# Patient Record
Sex: FEMALE | Race: BLACK OR AFRICAN AMERICAN | NOT HISPANIC OR LATINO | Employment: OTHER | URBAN - METROPOLITAN AREA
[De-identification: names, ages, dates, MRNs, and addresses within clinical notes are randomized per-mention and may not be internally consistent; named-entity substitution may affect disease eponyms.]

---

## 2017-02-14 ENCOUNTER — TELEPHONE (OUTPATIENT)
Dept: TRANSPLANT | Facility: CLINIC | Age: 37
End: 2017-02-14

## 2017-02-14 NOTE — TELEPHONE ENCOUNTER
"Notified that pt has missed her 3rd Psych appt. I called and spoke with pt who states she cancelled her last appt as "I didn't have a ride, well actually I have a car but I didn't have the money to buy gas." I explained to her that she will not be transplanted if she does not see psych and praised her for doing all the other workup. She states one of the appts was scheduled on a dialysis day so she could not keep that appt for that reason. I did confirm her dialysis days TTS. She also states she has started school for dialysis technician which makes it hard to change dialysis times. I reiterated to her if she does not keep her next psych appt her chart will be closed. She stated "Oh I will not miss it."  "

## 2017-03-06 ENCOUNTER — CLINICAL SUPPORT (OUTPATIENT)
Dept: SMOKING CESSATION | Facility: CLINIC | Age: 37
End: 2017-03-06
Payer: COMMERCIAL

## 2017-03-06 DIAGNOSIS — F17.200 NICOTINE DEPENDENCE: Primary | ICD-10-CM

## 2017-03-06 PROCEDURE — 99407 BEHAV CHNG SMOKING > 10 MIN: CPT | Mod: S$GLB,,,

## 2017-05-02 ENCOUNTER — TELEPHONE (OUTPATIENT)
Dept: PSYCHIATRY | Facility: CLINIC | Age: 37
End: 2017-05-02

## 2017-05-04 ENCOUNTER — TELEPHONE (OUTPATIENT)
Dept: TRANSPLANT | Facility: CLINIC | Age: 37
End: 2017-05-04

## 2017-05-04 NOTE — TELEPHONE ENCOUNTER
I received a call from Robert social work at [facility] for this patient to verify patients psych appointment so he can encourage her compliance. He states pt is aware that she must keep this appt so her chart is not closed. She does say however her last appt was cancelled in error due to no fault of her own and before that she was scheduled on a dialysis day and could not make it.

## 2017-07-03 ENCOUNTER — TELEPHONE (OUTPATIENT)
Dept: PSYCHIATRY | Facility: CLINIC | Age: 37
End: 2017-07-03

## 2017-08-15 ENCOUNTER — TELEPHONE (OUTPATIENT)
Dept: TRANSPLANT | Facility: CLINIC | Age: 37
End: 2017-08-15

## 2017-08-15 NOTE — TELEPHONE ENCOUNTER
----- Message from Cynthia Blevins sent at 8/15/2017  9:08 AM CDT -----  Contact: Ce Colunga/.Dialysis center   Wants a call from coord @ # [phone] Ext # [phone]    Calling to find out when pt can re-apply for an eval

## 2017-08-15 NOTE — TELEPHONE ENCOUNTER
"Call returned to Ce Colunga unit manager for dialysis unit. She is inquiring what pt would need to do for re-refferal. I explained to her pt would need psychiatry clearance to come in the door as pt cancelled/no showed 3 psychiatry appts. I had lengthy conversation with Robert,  at the unit, before the last missed appt and he stated he would speak with the pt about importance of keeping the appt. Ce states pt is very compliant in the unit, "one of my best patients." She states Ted did not start out that way but has made great strides. Ce will work her to get psychiatry clearance.  "

## 2017-09-26 ENCOUNTER — TELEPHONE (OUTPATIENT)
Dept: TRANSPLANT | Facility: CLINIC | Age: 37
End: 2017-09-26

## 2017-09-26 NOTE — TELEPHONE ENCOUNTER
Returned call to Kathryn who is the  at the dialysis unit. She wanted to know what Ted needed to do for re-referral. I advised that she needs clearance from Addictive psychiatry related to history of cocaine use and current PICA. I advised that if psychiatry has questions we can be called. I also advised that workup process would start over again as workup has .

## 2017-09-26 NOTE — TELEPHONE ENCOUNTER
----- Message from Shahnaz Lynn sent at 9/26/2017  9:54 AM CDT -----  Contact: Kathryn Lehman from the Dialysis Center in [place] calling to find out information about getting a kidney referral for this patient.      Please call [phone] ext [phone]    Thanks!!

## 2017-09-28 ENCOUNTER — TELEPHONE (OUTPATIENT)
Dept: PSYCHIATRY | Facility: CLINIC | Age: 37
End: 2017-09-28

## 2018-06-05 ENCOUNTER — TELEPHONE (OUTPATIENT)
Dept: TRANSPLANT | Facility: CLINIC | Age: 38
End: 2018-06-05

## 2018-06-05 NOTE — TELEPHONE ENCOUNTER
Nurse spoke with Robert and informed him that the patient was denied on 8/8/17 due to multiple no shows/ cancellations for regular and addictive psychiatry. Robert verbalized understanding of the above information.

## 2018-06-05 NOTE — TELEPHONE ENCOUNTER
----- Message from Veronica Victor sent at 6/5/2018  8:12 AM CDT -----  Contact: Robert ballard/ [facility]    Reason for call:    Check Status on Kidney Xplant Status   Communication Preference: [phone]  Additional Information:

## 2018-07-31 ENCOUNTER — TELEPHONE (OUTPATIENT)
Dept: TRANSPLANT | Facility: CLINIC | Age: 38
End: 2018-07-31

## 2018-07-31 NOTE — TELEPHONE ENCOUNTER
Nurse spoke with Kathryn rodriguez clermarcie at patients dialysis unit. She was informed that patient will need regular and addictive psychiatry clearance before being re referred. Kathryn verbalized understanding of the above information.

## 2018-07-31 NOTE — TELEPHONE ENCOUNTER
----- Message from Veronica Victor sent at 7/31/2018  9:33 AM CDT -----  Contact: [facility]  Needs Advice    Reason for call:   Requesting a call from coordinator to verify what all needs to be done to re-admit pt for transplant.   Communication Preference: Kathryn [phone], ext [phone]  Additional Information: n/a

## 2018-10-04 PROBLEM — N87.9 CERVICAL DYSPLASIA: Status: ACTIVE | Noted: 2018-10-04

## 2018-10-04 PROBLEM — N87.9 CERVICAL DYSPLASIA: Status: RESOLVED | Noted: 2018-10-04 | Resolved: 2018-10-04

## 2018-10-04 PROBLEM — Z90.710 S/P TAH (TOTAL ABDOMINAL HYSTERECTOMY): Status: ACTIVE | Noted: 2018-10-04

## 2018-10-26 ENCOUNTER — SOCIAL WORK (OUTPATIENT)
Dept: TRANSPLANT | Facility: CLINIC | Age: 38
End: 2018-10-26

## 2018-10-26 NOTE — PROGRESS NOTES
SW was contacted by referral coordinator: MIKE Song RN who reports receiving a psych note from pt's dialysis unit : Robert Estevez LMSW since pt was previously deferred for needing to be cleared by psych. Pt no showed/cacncelled multiple appointments with [facility] psych and they are no longer willing to see her. Pt then was seen by a local psychologist: Valente García, PhD([phone], ph; [phone], fax). SW reviewed the note and in this SW opinion it did not indicate that pt was cleared by this psychologist. The note does indicated that pt has agreed to psychotherapy and this SW would recommend that pt continue receive care for her pica, anxiety, depression and other diagnoses indicated in this note. SW will work with pt's dialysis unit to request updated information regarding pt's mental health treatment.    Psychosocial Suitability: Patient presents as an unacceptable candidate for [facility] at this time. Pt will need to continue therapy as indicated, follow through with recommendations, and receive psych clearance prior to being referred for transplant evaluation.      Copy of note is below:

## 2018-12-12 ENCOUNTER — TELEPHONE (OUTPATIENT)
Dept: TRANSPLANT | Facility: CLINIC | Age: 38
End: 2018-12-12

## 2018-12-12 NOTE — TELEPHONE ENCOUNTER
MINI contacted MINI Jones at pt's dialysis unit. MINI explained that after reading the note from pt's psychiatrist visit that pt was not cleared from a psychiatric standpoint at this time. MINI explained that the expectation from the transplant team is that pt follow through with recommendations from the psychiatrist or other mental health professionals. Robert verbalized understanding and agreement. MINI explained that once pt has psych clearance, then she can be re-referred. MINI remains available to pt, pt's family, and transplant team at [phone].            ----- Message from Breanna Song RN sent at 12/12/2018 10:12 AM CST -----  Contact:  [facility] in [place],     Please see the message below. Can you please call and speak with Robert regarding the patients need for psych. Clearance? Let me know when you are done so I can send the denial letter.     ThanksBreanna   ----- Message -----  From: Veronica Victor  Sent: 12/12/2018   9:35 AM  To: [facility]-Kidney Transplant [facility]    Needs Advice    Reason for call: Requesting transplant update sheet documents faxed to [phone]        Communication Preference: Phone: Robert [phone]    Additional Information: n/a

## 2018-12-13 ENCOUNTER — TELEPHONE (OUTPATIENT)
Dept: TRANSPLANT | Facility: CLINIC | Age: 38
End: 2018-12-13

## 2018-12-13 NOTE — TELEPHONE ENCOUNTER
----- Message from Taniya Lynn LCSW sent at 12/12/2018 10:39 AM CST -----  Contact:  [facility] in [place], [place]   Spoke with Robert and explained our concerns about pt's mental health and that we are expecting her to follow though with recommendations.  ----- Message -----  From: Breanna Song RN  Sent: 12/12/2018  10:12 AM  To: Taniya Lynn LCSW    Dimitri,     Please see the message below. Can you please call and speak with Robert regarding the patients need for psych. Clearance? Let me know when you are done so I can send the denial letter.     ThanksBreanna   ----- Message -----  From: Veronica Victor  Sent: 12/12/2018   9:35 AM  To: [facility] Pre-Kidney Transplant Clinical    Needs Advice    Reason for call: Requesting transplant update sheet documents faxed to [phone]        Communication Preference: Phone: Robert [phone]    Additional Information: n/a

## 2024-04-05 ENCOUNTER — TELEPHONE (OUTPATIENT)
Dept: GYNECOLOGIC ONCOLOGY | Facility: CLINIC | Age: 44
End: 2024-04-05
Payer: COMMERCIAL

## 2024-04-05 NOTE — NURSING
New referral received from Dr Stover for recent diagnosis of ovarian masses. Pat from Dr Stover's office contacted navigator with update that Dr Stover will send his op-note once completed, but the referral was due to Dr Stover halting a surgery due to multiple masses noted once surgery began. Dr Branch feels it best to have GYN/ONC see her to address her masses.     Pt called and name and  verified. Explained my role as nurse navigator and direct number provided. Options for GYN/ONC providers, including [facility], all clinic locations and soonest availability discussed with pt. Pt scheduled to see Merna Loomis PA-C in the next available appointment. Patient encouraged to call for any questions or concerns about her care or appointments. Pt verbalized understanding. Date time and location of appointment reviewed with pt. Appointment letter mailed and emailed to pt.     Oncology Navigation   Intake  Cancer Type: Gynecologic (ovarian masses)  Type of Referral: External (Dr Tyrees Stover)  Date of Referral: 24  Initial Nurse Navigator Contact: 24  Referral to Initial Contact Timeline (days): 9  Date Worked: 24     Treatment  Current Status: Staging work-up    Procedures: Ultrasound; Biopsy; Other  Biopsy Schedule Date: 24 (cul-de-sac fluid- negative for malignancy, mildly cellular smears showing few benign mesothelial cells in a background of blood)  Ultrasound Schedule Date: 24 ([place]= one complex cyst measures roughly 5.1x5x4.7cm. the other complex cyst measures roughtly 3.9x3.2x3.1cm)  Other Schedule Date: 24 (labs: HE4= 1621; = 13; CA25= 15.5)     Acuity      Follow Up:    Obtain Operative note from Dr Stover's office  LVM for Pat to send once completed

## 2024-04-08 NOTE — PROGRESS NOTES
REFERRING PROVIDER  Tyrese Stover MD   Nephrologist: Dr. Jason Peraza    HISTORY OF PRESENT CONDITION  CC: R ovarian cysts  Ted Edwards is a 43 y.o.  New referral received from Dr Stover for recent diagnosis of ovarian masses. Initially presented with pelvic pain. Imaging showed b/l ovarian cysts. She underwent diagnostic laparoscopy which was aborted due to multiple masses noted once surgery began (op note pending). Cul-de-sac fluid Cytology negative,  CA-125 WNL.  Patient presents today with her daughter for consult. Confirms history above. Endorses continued pelvic pain/pressure and constipation.     Of note, history of MARYLOU (2018, persistent cervical dysplasia), Cholecystectomy, tubal ligation,  x3 (lower transverse), LEEP (CIN3). End stage renal disease (was on  HD since , recently switched to HHD)     Data Reviewed:   24 (US done at [facility]): one complex cyst measures roughly 5.1x5x4.7cm. the other complex cyst measures roughtly 3.9x3.2x3.1cm. cysts with internal echos and septationsprior hysterectomy     24 diagnostic laparoscopy : cul-de-sac fluid- negative for malignancy, mildly cellular smears showing few benign mesothelial cells in a background of blood     24 labs: HE4= 1621; = 13; CA25= 15.5    Past Medical History:   Diagnosis Date    Abnormal Pap smear of cervix     Anemia     Chronic kidney disease     Dialysis patient     Disorder of kidney and ureter     Encounter for blood transfusion     1 time    Hyperlipidemia     Hypertension     Obesity     Pica in adults     eating baby powder    Substance abuse     cocaine use -occassional use, stopped in     Thyroid disease       Current Outpatient Medications   Medication Sig    acetaminophen (TYLENOL) 325 MG tablet Take 2 tablets (650 mg total) by mouth every 8 (eight) hours as needed for Pain.    amlodipine (NORVASC) 5 MG tablet Take 5 mg by mouth once daily.    aspirin  (ECOTRIN) 81 MG EC tablet Take 81 mg by mouth once daily.    cholecalciferol, vitamin D3, (VITAMIN D3) 400 unit Chew Take 3 tablets by mouth once daily.    cloNIDine (CATAPRES) 0.2 MG tablet Take 0.2 mg by mouth 3 (three) times daily.    cyclobenzaprine (FLEXERIL) 10 MG tablet Take 10 mg by mouth 3 (three) times daily as needed for Muscle spasms.    HYDROcodone-acetaminophen (NORCO)  mg per tablet Take 1 tablet by mouth every 6 (six) hours as needed for Pain.    ipratropium-albuterol (COMBIVENT)  mcg/actuation inhaler Inhale 1 puff into the lungs every 6 (six) hours as needed for Wheezing.     omega-3 fatty acids-vitamin E (FISH OIL) 1,000 mg Cap Take 1 capsule by mouth once daily.    pantoprazole (PROTONIX) 40 MG tablet     rosuvastatin (CRESTOR) 10 MG tablet     sevelamer carbonate (RENVELA) 800 mg Tab Take 4,000 mg by mouth 3 (three) times daily with meals. 2400 mg with snacks    terbinafine HCl (LAMISIL) 250 mg tablet     zolpidem (AMBIEN) 10 mg Tab      Current Facility-Administered Medications   Medication    ondansetron disintegrating tablet 8 mg    promethazine (PHENERGAN) 12.5 mg in dextrose 5 % 50 mL IVPB     Review of patient's allergies indicates:  No Known Allergies    Past Surgical History:   Procedure Laterality Date    AV FISTULA PLACEMENT Right     CERVICAL BIOPSY  W/ LOOP ELECTRODE EXCISION       SECTION      x3    CHOLECYSTECTOMY      COLPOSCOPY      LAPAROSCOPIC TOTAL HYSTERECTOMY N/A 10/4/2018    Procedure: HYSTERECTOMY, TOTAL, LAPAROSCOPIC;  Surgeon: Carlo Espino MD;  Location: [facility];  Service: OB/GYN;  Laterality: N/A;  Converted to MARYLOU    LUNG SURGERY      left side fluid removal    thyroidectomy Left     partial    TONSILLECTOMY      TOTAL ABDOMINAL HYSTERECTOMY N/A 10/4/2018    Procedure: HYSTERECTOMY, TOTAL, ABDOMINAL;  Surgeon: Carlo Espino MD;  Location: [facility];  Service: OB/GYN;  Laterality: N/A;    TUBAL LIGATION          FAMILY HISTORY  Family History    Problem Relation Age of Onset    Clotting disorder Mother     Heart disease Father     Heart attack Father        SOCIAL HISTORY    reports that she has been smoking cigarettes. She started smoking about 16 years ago. She has a 0.9 pack-year smoking history. She has never used smokeless tobacco. She reports that she does not drink alcohol and does not use drugs.    REVIEW OF SYSTEMS  Review of Systems   Constitutional:  Negative for activity change, fatigue, fever and unexpected weight change.   Respiratory:  Negative for cough and shortness of breath.    Cardiovascular:  Negative for chest pain.   Gastrointestinal:  Positive for constipation. Negative for abdominal distention, abdominal pain and diarrhea.   Genitourinary:  Positive for pelvic pain. Negative for difficulty urinating, dysuria, frequency, urgency, vaginal bleeding and vaginal pain.   Neurological:  Negative for dizziness and weakness.       OBJECTIVE   Vitals:    04/11/24 1325   BP: 128/80   Pulse: 67      Body mass index is 33.3 kg/m².     Physical Exam:   Constitutional: She is oriented to person, place, and time. She appears well-developed.        Pulmonary/Chest: Effort normal and breath sounds normal. No respiratory distress.        Abdominal: Soft. She exhibits no distension. There is no abdominal tenderness.   Lower Transverse incision          Genitourinary:    Genitourinary Comments: Vulva - normal  Vagina - no lesions  Cervix - surgically absent  Uterus - surgically absent  Adnexa - no masses appreciated on bimanual                  Neurological: She is alert and oriented to person, place, and time.    Skin: No rash noted.    Psychiatric: She has a normal mood and affect. Judgment normal.     ECOG status: 1    LABORATORY DATA  Lab data reviewed.    RADIOLOGICAL DATA  Radiology data reviewed.    PATHOLOGY DATA  Pathology data reviewed.    ASSESSMENT    1. Ovarian mass    2. Cyst of ovary, unspecified laterality          PLAN  Orders Placed  This Encounter   Procedures    US Transvaginal Non OB   Follow-up TVUS - scheduled on 4/17/24  Obtain op note from diagnostic lap (Dr. Stover)   Once imaging obtained, will discuss case with Dr. Rutherford and update patient on definitive plan.     Merna Loomis PA-C

## 2024-04-11 ENCOUNTER — OFFICE VISIT (OUTPATIENT)
Dept: GYNECOLOGIC ONCOLOGY | Facility: CLINIC | Age: 44
End: 2024-04-11
Payer: COMMERCIAL

## 2024-04-11 VITALS
HEIGHT: 64 IN | HEART RATE: 67 BPM | DIASTOLIC BLOOD PRESSURE: 80 MMHG | BODY MASS INDEX: 33.12 KG/M2 | WEIGHT: 194 LBS | SYSTOLIC BLOOD PRESSURE: 128 MMHG

## 2024-04-11 DIAGNOSIS — N83.8 OVARIAN MASS: Primary | ICD-10-CM

## 2024-04-11 DIAGNOSIS — N83.209 CYST OF OVARY, UNSPECIFIED LATERALITY: ICD-10-CM

## 2024-04-11 PROCEDURE — 3079F DIAST BP 80-89 MM HG: CPT | Mod: CPTII,S$GLB,,

## 2024-04-11 PROCEDURE — 3074F SYST BP LT 130 MM HG: CPT | Mod: CPTII,S$GLB,,

## 2024-04-11 PROCEDURE — 99204 OFFICE O/P NEW MOD 45 MIN: CPT | Mod: S$GLB,,,

## 2024-04-11 PROCEDURE — 3008F BODY MASS INDEX DOCD: CPT | Mod: CPTII,S$GLB,,

## 2024-04-11 PROCEDURE — 1159F MED LIST DOCD IN RCRD: CPT | Mod: CPTII,S$GLB,,

## 2024-04-17 ENCOUNTER — HOSPITAL ENCOUNTER (OUTPATIENT)
Dept: RADIOLOGY | Facility: HOSPITAL | Age: 44
Discharge: HOME | End: 2024-04-17
Payer: COMMERCIAL

## 2024-04-17 DIAGNOSIS — N83.8 OVARIAN MASS: ICD-10-CM

## 2024-04-17 PROCEDURE — 76830 TRANSVAGINAL US NON-OB: CPT | Mod: TC

## 2024-04-26 ENCOUNTER — TELEPHONE (OUTPATIENT)
Dept: GYNECOLOGIC ONCOLOGY | Facility: CLINIC | Age: 44
End: 2024-04-26
Payer: COMMERCIAL

## 2024-04-26 NOTE — TELEPHONE ENCOUNTER
Called patient to discuss her care. Dr. Rutherford will reach out to Dr. Stover to get more information regarding some of the intra-op findings he reported. He will also review the intra-op photos in person when he is here in [place] on May 7th. Will update with final recommendations at that time.

## 2024-05-08 ENCOUNTER — TELEPHONE (OUTPATIENT)
Dept: GYNECOLOGIC ONCOLOGY | Facility: CLINIC | Age: 44
End: 2024-05-08
Payer: COMMERCIAL

## 2024-05-08 NOTE — TELEPHONE ENCOUNTER
Called Dr. Stover office and left a message with his staff to have him call Dr. Rutherford to discuss op report. Dr. Stover is out today but will be back in office tomorrow. I provided Dr. Rutherford and my cell phone number.

## 2025-07-24 ENCOUNTER — HOSPITAL ENCOUNTER (INPATIENT)
Facility: HOSPITAL | Age: 45
LOS: 4 days | Discharge: HOME | DRG: 640 | End: 2025-07-28
Attending: EMERGENCY MEDICINE | Admitting: STUDENT IN AN ORGANIZED HEALTH CARE EDUCATION/TRAINING PROGRAM
Payer: MEDICARE

## 2025-07-24 DIAGNOSIS — R06.02 SHORTNESS OF BREATH: ICD-10-CM

## 2025-07-24 DIAGNOSIS — Z99.2 ANEMIA IN CHRONIC KIDNEY DISEASE, ON CHRONIC DIALYSIS: Primary | ICD-10-CM

## 2025-07-24 DIAGNOSIS — D63.1 ANEMIA IN CHRONIC KIDNEY DISEASE, ON CHRONIC DIALYSIS: Primary | ICD-10-CM

## 2025-07-24 DIAGNOSIS — N18.6 ANEMIA IN CHRONIC KIDNEY DISEASE, ON CHRONIC DIALYSIS: Primary | ICD-10-CM

## 2025-07-24 DIAGNOSIS — E87.70 FLUID OVERLOAD: ICD-10-CM

## 2025-07-24 DIAGNOSIS — R07.9 CHEST PAIN: ICD-10-CM

## 2025-07-24 DIAGNOSIS — I21.4 NSTEMI (NON-ST ELEVATED MYOCARDIAL INFARCTION): ICD-10-CM

## 2025-07-24 LAB
ALBUMIN SERPL-MCNC: 4.1 G/DL (ref 3.5–5)
ALBUMIN/GLOB SERPL: 1.1 RATIO (ref 1.1–2)
ALP SERPL-CCNC: 77 UNIT/L (ref 40–150)
ALT SERPL-CCNC: 17 UNIT/L (ref 0–55)
ANION GAP SERPL CALC-SCNC: 20 MEQ/L
AST SERPL-CCNC: 15 UNIT/L (ref 11–45)
BASOPHILS # BLD AUTO: 0.04 X10(3)/MCL
BASOPHILS NFR BLD AUTO: 0.5 %
BILIRUB SERPL-MCNC: 0.8 MG/DL
BUN SERPL-MCNC: 90.1 MG/DL (ref 7–18.7)
CALCIUM SERPL-MCNC: 9.9 MG/DL (ref 8.4–10.2)
CHLORIDE SERPL-SCNC: 102 MMOL/L (ref 98–107)
CO2 SERPL-SCNC: 20 MMOL/L (ref 22–29)
CREAT SERPL-MCNC: 28.5 MG/DL (ref 0.55–1.02)
CREAT/UREA NIT SERPL: 3
EOSINOPHIL # BLD AUTO: 0.09 X10(3)/MCL (ref 0–0.9)
EOSINOPHIL NFR BLD AUTO: 1.2 %
ERYTHROCYTE [DISTWIDTH] IN BLOOD BY AUTOMATED COUNT: 15.4 % (ref 11.5–17)
GFR SERPLBLD CREATININE-BSD FMLA CKD-EPI: 1 ML/MIN/1.73/M2
GLOBULIN SER-MCNC: 3.8 GM/DL (ref 2.4–3.5)
GLUCOSE SERPL-MCNC: 103 MG/DL (ref 74–100)
HCT VFR BLD AUTO: 23.7 % (ref 37–47)
HGB BLD-MCNC: 7.7 G/DL (ref 12–16)
IMM GRANULOCYTES # BLD AUTO: 0.05 X10(3)/MCL (ref 0–0.04)
IMM GRANULOCYTES NFR BLD AUTO: 0.7 %
LYMPHOCYTES # BLD AUTO: 1.05 X10(3)/MCL (ref 0.6–4.6)
LYMPHOCYTES NFR BLD AUTO: 13.9 %
MCH RBC QN AUTO: 31.3 PG (ref 27–31)
MCHC RBC AUTO-ENTMCNC: 32.5 G/DL (ref 33–36)
MCV RBC AUTO: 96.3 FL (ref 80–94)
MONOCYTES # BLD AUTO: 0.55 X10(3)/MCL (ref 0.1–1.3)
MONOCYTES NFR BLD AUTO: 7.3 %
NEUTROPHILS # BLD AUTO: 5.76 X10(3)/MCL (ref 2.1–9.2)
NEUTROPHILS NFR BLD AUTO: 76.4 %
NRBC BLD AUTO-RTO: 0 %
OHS QRS DURATION: 96 MS
OHS QTC CALCULATION: 468 MS
PLATELET # BLD AUTO: 174 X10(3)/MCL (ref 130–400)
PMV BLD AUTO: 9.8 FL (ref 7.4–10.4)
POTASSIUM SERPL-SCNC: 4.1 MMOL/L (ref 3.5–5.1)
PROT SERPL-MCNC: 7.9 GM/DL (ref 6.4–8.3)
RBC # BLD AUTO: 2.46 X10(6)/MCL (ref 4.2–5.4)
SODIUM SERPL-SCNC: 142 MMOL/L (ref 136–145)
TROPONIN I SERPL HS-MCNC: 40 NG/L
WBC # BLD AUTO: 7.54 X10(3)/MCL (ref 4.5–11.5)

## 2025-07-24 PROCEDURE — 85025 COMPLETE CBC W/AUTO DIFF WBC: CPT

## 2025-07-24 PROCEDURE — 93010 ELECTROCARDIOGRAM REPORT: CPT | Mod: ,,, | Performed by: INTERNAL MEDICINE

## 2025-07-24 PROCEDURE — 11000001 HC ACUTE MED/SURG PRIVATE ROOM

## 2025-07-24 PROCEDURE — 93005 ELECTROCARDIOGRAM TRACING: CPT

## 2025-07-24 PROCEDURE — 84484 ASSAY OF TROPONIN QUANT: CPT

## 2025-07-24 PROCEDURE — 21400001 HC TELEMETRY ROOM

## 2025-07-24 PROCEDURE — 80053 COMPREHEN METABOLIC PANEL: CPT

## 2025-07-24 PROCEDURE — 99285 EMERGENCY DEPT VISIT HI MDM: CPT | Mod: 25

## 2025-07-24 RX ORDER — HYDRALAZINE HYDROCHLORIDE 25 MG/1
25 TABLET, FILM COATED ORAL 2 TIMES DAILY
Status: DISCONTINUED | OUTPATIENT
Start: 2025-07-25 | End: 2025-07-26

## 2025-07-24 RX ORDER — BUSPIRONE HYDROCHLORIDE 5 MG/1
10 TABLET ORAL 3 TIMES DAILY
Status: DISCONTINUED | OUTPATIENT
Start: 2025-07-25 | End: 2025-07-28 | Stop reason: HOSPADM

## 2025-07-24 RX ORDER — ALBUTEROL SULFATE 90 UG/1
1 INHALANT RESPIRATORY (INHALATION) EVERY 6 HOURS PRN
Status: DISCONTINUED | OUTPATIENT
Start: 2025-07-24 | End: 2025-07-28 | Stop reason: HOSPADM

## 2025-07-24 RX ORDER — PANTOPRAZOLE SODIUM 40 MG/1
40 TABLET, DELAYED RELEASE ORAL DAILY
Status: DISCONTINUED | OUTPATIENT
Start: 2025-07-25 | End: 2025-07-28 | Stop reason: HOSPADM

## 2025-07-24 RX ORDER — ATORVASTATIN CALCIUM 40 MG/1
40 TABLET, FILM COATED ORAL DAILY
Status: DISCONTINUED | OUTPATIENT
Start: 2025-07-25 | End: 2025-07-28 | Stop reason: HOSPADM

## 2025-07-24 RX ORDER — AMLODIPINE BESYLATE 5 MG/1
10 TABLET ORAL DAILY
Status: DISCONTINUED | OUTPATIENT
Start: 2025-07-25 | End: 2025-07-28 | Stop reason: HOSPADM

## 2025-07-24 RX ORDER — FERRIC CITRATE 210 MG/1
3 TABLET, FILM COATED ORAL
COMMUNITY

## 2025-07-24 RX ORDER — CLONAZEPAM 1 MG/1
1 TABLET ORAL NIGHTLY PRN
COMMUNITY

## 2025-07-24 RX ORDER — OMEPRAZOLE 40 MG/1
40 CAPSULE, DELAYED RELEASE ORAL EVERY MORNING
COMMUNITY

## 2025-07-24 RX ORDER — ALBUTEROL SULFATE 90 UG/1
1 INHALANT RESPIRATORY (INHALATION) EVERY 6 HOURS PRN
COMMUNITY
Start: 2025-06-09

## 2025-07-24 RX ORDER — PLECANATIDE 3 MG/1
3 TABLET ORAL DAILY
COMMUNITY

## 2025-07-24 RX ORDER — LEVOTHYROXINE SODIUM 50 UG/1
50 TABLET ORAL DAILY
Status: DISCONTINUED | OUTPATIENT
Start: 2025-07-25 | End: 2025-07-28 | Stop reason: HOSPADM

## 2025-07-24 RX ORDER — AMLODIPINE BESYLATE 10 MG/1
10 TABLET ORAL DAILY
COMMUNITY
Start: 2025-06-27

## 2025-07-24 RX ORDER — SEVELAMER CARBONATE 800 MG/1
800 TABLET, FILM COATED ORAL
Status: DISCONTINUED | OUTPATIENT
Start: 2025-07-25 | End: 2025-07-28 | Stop reason: HOSPADM

## 2025-07-24 RX ORDER — LEVOTHYROXINE SODIUM 50 UG/1
50 TABLET ORAL DAILY
COMMUNITY

## 2025-07-24 RX ORDER — DOCUSATE SODIUM 100 MG/1
100 CAPSULE, LIQUID FILLED ORAL 2 TIMES DAILY PRN
Status: DISCONTINUED | OUTPATIENT
Start: 2025-07-24 | End: 2025-07-28 | Stop reason: HOSPADM

## 2025-07-24 RX ORDER — DOCUSATE SODIUM 100 MG/1
100 CAPSULE, LIQUID FILLED ORAL 2 TIMES DAILY PRN
COMMUNITY

## 2025-07-24 RX ORDER — ONDANSETRON 4 MG/1
8 TABLET, ORALLY DISINTEGRATING ORAL EVERY 8 HOURS PRN
Status: DISCONTINUED | OUTPATIENT
Start: 2025-07-24 | End: 2025-07-28 | Stop reason: HOSPADM

## 2025-07-24 RX ORDER — HYDRALAZINE HYDROCHLORIDE 25 MG/1
25 TABLET, FILM COATED ORAL 2 TIMES DAILY
Status: ON HOLD | COMMUNITY
End: 2025-07-28

## 2025-07-24 RX ORDER — CLONIDINE HYDROCHLORIDE 0.3 MG/1
0.3 TABLET ORAL 3 TIMES DAILY
COMMUNITY

## 2025-07-24 RX ORDER — BUSPIRONE HYDROCHLORIDE 10 MG/1
10 TABLET ORAL 2 TIMES DAILY
COMMUNITY

## 2025-07-24 RX ORDER — ROSUVASTATIN CALCIUM 10 MG/1
10 TABLET, COATED ORAL DAILY
COMMUNITY

## 2025-07-24 RX ORDER — FLUTICASONE PROPIONATE 50 MCG
2 SPRAY, SUSPENSION (ML) NASAL DAILY PRN
COMMUNITY
Start: 2025-06-06

## 2025-07-24 NOTE — FIRST PROVIDER EVALUATION
"Medical screening examination initiated.  I have conducted a focused provider triage encounter, findings are as follows:    Brief history of present illness:  44 year old female presents to ER with c/o Sob and swelling x 2 weeks. Reports intermittent palpitations. HX of CKD on dialysis.     Vitals:    07/24/25 1242   BP: (!) 170/89   Pulse: 87   Resp: 20   Temp: 98.2 °F (36.8 °C)   SpO2: 95%   Weight: 90.7 kg (200 lb)   Height: 5' 4" (1.626 m)       Pertinent physical exam:  awake and alert, nad    Brief workup plan:  labs, EKG,cxr    Preliminary workup initiated; this workup will be continued and followed by the physician or advanced practice provider that is assigned to the patient when roomed.  "

## 2025-07-24 NOTE — ED PROVIDER NOTES
Encounter Date: 2025    SCRIBE #1 NOTE: I, Arnel Shah, am scribing for, and in the presence of,  Chuck Garcia MD. I have scribed the entire note.       History     Chief Complaint   Patient presents with    Shortness of Breath     Steady gait to triage c/o worsening SOB and swelling for over 2 weeks. Reports intermittent episodes of palpitations. HX of CKD on dialysis      44 year old female with a hx of ESRD and HTN presents to the ED for SOB. Pt typically dialyzes 4x a week. Pt states she does her hemodialysis at home, but has not done it lately because she is feeling sick. Pt states she is currently dealing with bronchitis. Pt has a productive cough. Pt also reports episodes of SOB and diffuse fluid overload. Pt is a current smoker.         Review of patient's allergies indicates:  No Known Allergies  Past Medical History:   Diagnosis Date    Abnormal Pap smear of cervix     Anemia     Chronic kidney disease     Dialysis patient     Disorder of kidney and ureter     Encounter for blood transfusion     1 time    Hyperlipidemia     Hypertension     Obesity     Pica in adults     eating baby powder    Substance abuse     cocaine use -occassional use, stopped in     Thyroid disease      Past Surgical History:   Procedure Laterality Date    AV FISTULA PLACEMENT Right     CERVICAL BIOPSY  W/ LOOP ELECTRODE EXCISION       SECTION      x3    CHOLECYSTECTOMY      COLPOSCOPY      LAPAROSCOPIC TOTAL HYSTERECTOMY N/A 10/4/2018    Procedure: HYSTERECTOMY, TOTAL, LAPAROSCOPIC;  Surgeon: Carlo Espino MD;  Location: [facility] OR;  Service: OB/GYN;  Laterality: N/A;  Converted to MARYLOU    LUNG SURGERY      left side fluid removal    thyroidectomy Left     partial    TONSILLECTOMY      TOTAL ABDOMINAL HYSTERECTOMY N/A 10/4/2018    Procedure: HYSTERECTOMY, TOTAL, ABDOMINAL;  Surgeon: Carlo Espino MD;  Location: [facility] OR;  Service: OB/GYN;  Laterality: N/A;    TUBAL LIGATION       Family History   Problem Relation  Name Age of Onset    Clotting disorder Mother      Heart disease Father      Heart attack Father       Social History[1]  Review of Systems   Constitutional:         Fluid overloaded    Respiratory:  Positive for cough and shortness of breath.        Physical Exam     Initial Vitals [07/24/25 1242]   BP Pulse Resp Temp SpO2   (!) 170/89 87 20 98.2 °F (36.8 °C) 95 %      MAP       --         Physical Exam    HENT:   Facial plethora    Eyes: EOM are normal. Right eye exhibits no discharge. Left eye exhibits no discharge. No scleral icterus.   Neck: Neck supple.   Cardiovascular:  Normal rate, regular rhythm and normal heart sounds.           Pulmonary/Chest: No stridor. She has no wheezes.   Crackles at the right base.    Abdominal: She exhibits no distension. There is no abdominal tenderness. There is no rebound and no guarding.   Musculoskeletal:         General: No tenderness or edema. Normal range of motion.      Cervical back: Neck supple.     Neurological: She is alert and oriented to person, place, and time. She has normal strength.   Skin: Skin is dry. No rash noted. No erythema. No pallor.   Psychiatric: She has a normal mood and affect. Her behavior is normal. Judgment and thought content normal.         ED Course   Procedures  Labs Reviewed   COMPREHENSIVE METABOLIC PANEL - Abnormal       Result Value    Sodium 142      Potassium 4.1      Chloride 102      CO2 20 (*)     Glucose 103 (*)     Blood Urea Nitrogen 90.1 (*)     Creatinine 28.50 (*)     Calcium 9.9      Protein Total 7.9      Albumin 4.1      Globulin 3.8 (*)     Albumin/Globulin Ratio 1.1      Bilirubin Total 0.8      ALP 77      ALT 17      AST 15      eGFR 1      Anion Gap 20.0      BUN/Creatinine Ratio 3     TROPONIN I HIGH SENSITIVITY - Abnormal    Troponin High Sensitive 40 (*)    CBC WITH DIFFERENTIAL - Abnormal    WBC 7.54      RBC 2.46 (*)     Hgb 7.7 (*)     Hct 23.7 (*)     MCV 96.3 (*)     MCH 31.3 (*)     MCHC 32.5 (*)     RDW 15.4       Platelet 174      MPV 9.8      Neut % 76.4      Lymph % 13.9      Mono % 7.3      Eos % 1.2      Basophil % 0.5      Imm Grans % 0.7      Neut # 5.76      Lymph # 1.05      Mono # 0.55      Eos # 0.09      Baso # 0.04      Imm Gran # 0.05 (*)     NRBC% 0.0     CBC W/ AUTO DIFFERENTIAL    Narrative:     The following orders were created for panel order CBC auto differential.  Procedure                               Abnormality         Status                     ---------                               -----------         ------                     CBC with Differential[[id number]]       Abnormal            Final result                 Please view results for these tests on the individual orders.     EKG Readings: (Independently Interpreted)   Initial Reading: No STEMI. Rhythm: Normal Sinus Rhythm. Heart Rate: 85. Ectopy: No Ectopy. Conduction: Normal. ST Segments: Normal ST Segments. T Waves: Normal. Axis: Right Axis Deviation.   Done on 7/24/25 at 1242      ECG Results              EKG 12-lead (Final result)        Collection Time Result Time QRS Duration OHS QTC Calculation    07/24/25 12:42:05 07/24/25 14:50:59 96 468                     Final result by Interface, Lab In [facility] (07/24/25 14:51:07)                   Narrative:    Test Reason : R06.02,    Vent. Rate :  85 BPM     Atrial Rate :  85 BPM     P-R Int : 182 ms          QRS Dur :  96 ms      QT Int : 394 ms       P-R-T Axes :  41  94  43 degrees    QTcB Int : 468 ms    Normal sinus rhythm  Rightward axis  Possible Anterior infarct ,age undetermined  Abnormal ECG  When compared with ECG of 06-Sep-2018 12:45,  No significant change was found  Confirmed by Dean Collins ([id number]) on 7/24/2025 2:50:56 PM    Referred By:            Confirmed By: Dean Collins                                  Imaging Results              X-Ray Chest PA And Lateral (Final result)  Result time 07/24/25 13:11:38      Final result by Deng Michael MD (07/24/25 13:11:38)                    Impression:      Cardiomegaly and mild pulmonary edema.      Electronically signed by: Deng Michael  Date:    2025  Time:    13:11               Narrative:    EXAMINATION:  XR CHEST PA AND LATERAL    CLINICAL HISTORY:  Shortness of breath    TECHNIQUE:  Two-view    COMPARISON:  2016..    FINDINGS:  Cardiopericardial silhouette is enlarged there is mild pulmonary edema with subtle ground-glass opacities.  Right basilar pleuroparenchymal scarring versus small pleural effusion and lower lung zone atelectasis.  There is no pneumothorax.                                       Medications - No data to display  Medical Decision Making  The differential diagnosis includes, but is not limited to, electrolyte abnormality, fluid overload, or pneumonia.     Risk  Decision regarding hospitalization.            Scribe Attestation:   Scribe #1: I performed the above scribed service and the documentation accurately describes the services I performed. I attest to the accuracy of the note.    Attending Attestation:           Physician Attestation for Scribe:  Physician Attestation Statement for Scribe #1: I, Chuck Garcia MD, reviewed documentation, as scribed by Arnel Shah in my presence, and it is both accurate and complete.                                        Clinical Impression:  Final diagnoses:  [R06.02] Shortness of breath  [E87.70] Fluid overload          ED Disposition Condition    Admit                       [1]   Social History  Tobacco Use    Smoking status: Every Day     Current packs/day: 0.00     Average packs/day: 0.1 packs/day for 9.0 years (0.9 ttl pk-yrs)     Types: Cigarettes     Start date: 2007     Last attempt to quit: 2016     Years since quittin.8    Smokeless tobacco: Never   Substance Use Topics    Alcohol use: No     Alcohol/week: 0.0 standard drinks of alcohol    Drug use: No     Comment: history        Chuck Garcia MD  25 1800

## 2025-07-25 LAB
ALBUMIN SERPL-MCNC: 3.7 G/DL (ref 3.5–5)
ALBUMIN/GLOB SERPL: 1.2 RATIO (ref 1.1–2)
ALP SERPL-CCNC: 68 UNIT/L (ref 40–150)
ALT SERPL-CCNC: 12 UNIT/L (ref 0–55)
ANION GAP SERPL CALC-SCNC: 20 MEQ/L
AST SERPL-CCNC: 13 UNIT/L (ref 11–45)
BASOPHILS # BLD AUTO: 0.03 X10(3)/MCL
BASOPHILS NFR BLD AUTO: 0.4 %
BILIRUB SERPL-MCNC: 0.7 MG/DL
BUN SERPL-MCNC: 94.8 MG/DL (ref 7–18.7)
CALCIUM SERPL-MCNC: 9.5 MG/DL (ref 8.4–10.2)
CHLORIDE SERPL-SCNC: 103 MMOL/L (ref 98–107)
CO2 SERPL-SCNC: 20 MMOL/L (ref 22–29)
CREAT SERPL-MCNC: 29.8 MG/DL (ref 0.55–1.02)
CREAT/UREA NIT SERPL: 3
EOSINOPHIL # BLD AUTO: 0.12 X10(3)/MCL (ref 0–0.9)
EOSINOPHIL NFR BLD AUTO: 1.7 %
ERYTHROCYTE [DISTWIDTH] IN BLOOD BY AUTOMATED COUNT: 14.9 % (ref 11.5–17)
GFR SERPLBLD CREATININE-BSD FMLA CKD-EPI: 1 ML/MIN/1.73/M2
GLOBULIN SER-MCNC: 3.2 GM/DL (ref 2.4–3.5)
GLUCOSE SERPL-MCNC: 104 MG/DL (ref 74–100)
HCT VFR BLD AUTO: 23.1 % (ref 37–47)
HGB BLD-MCNC: 7.6 G/DL (ref 12–16)
IMM GRANULOCYTES # BLD AUTO: 0.04 X10(3)/MCL (ref 0–0.04)
IMM GRANULOCYTES NFR BLD AUTO: 0.6 %
LYMPHOCYTES # BLD AUTO: 1.2 X10(3)/MCL (ref 0.6–4.6)
LYMPHOCYTES NFR BLD AUTO: 17 %
MAGNESIUM SERPL-MCNC: 3 MG/DL (ref 1.6–2.6)
MCH RBC QN AUTO: 31.1 PG (ref 27–31)
MCHC RBC AUTO-ENTMCNC: 32.9 G/DL (ref 33–36)
MCV RBC AUTO: 94.7 FL (ref 80–94)
MONOCYTES # BLD AUTO: 0.61 X10(3)/MCL (ref 0.1–1.3)
MONOCYTES NFR BLD AUTO: 8.7 %
NEUTROPHILS # BLD AUTO: 5.05 X10(3)/MCL (ref 2.1–9.2)
NEUTROPHILS NFR BLD AUTO: 71.6 %
NRBC BLD AUTO-RTO: 0.3 %
PHOSPHATE SERPL-MCNC: 8.9 MG/DL (ref 2.3–4.7)
PLATELET # BLD AUTO: 176 X10(3)/MCL (ref 130–400)
PMV BLD AUTO: 10.2 FL (ref 7.4–10.4)
POTASSIUM SERPL-SCNC: 4.2 MMOL/L (ref 3.5–5.1)
PROT SERPL-MCNC: 6.9 GM/DL (ref 6.4–8.3)
RBC # BLD AUTO: 2.44 X10(6)/MCL (ref 4.2–5.4)
SODIUM SERPL-SCNC: 143 MMOL/L (ref 136–145)
TROPONIN I SERPL HS-MCNC: 37 NG/L
WBC # BLD AUTO: 7.05 X10(3)/MCL (ref 4.5–11.5)

## 2025-07-25 PROCEDURE — 94640 AIRWAY INHALATION TREATMENT: CPT

## 2025-07-25 PROCEDURE — 94760 N-INVAS EAR/PLS OXIMETRY 1: CPT

## 2025-07-25 PROCEDURE — 85025 COMPLETE CBC W/AUTO DIFF WBC: CPT | Performed by: INTERNAL MEDICINE

## 2025-07-25 PROCEDURE — 63600175 PHARM REV CODE 636 W HCPCS

## 2025-07-25 PROCEDURE — 36415 COLL VENOUS BLD VENIPUNCTURE: CPT | Performed by: INTERNAL MEDICINE

## 2025-07-25 PROCEDURE — 80100016 HC MAINTENANCE HEMODIALYSIS

## 2025-07-25 PROCEDURE — 25000242 PHARM REV CODE 250 ALT 637 W/ HCPCS: Performed by: INTERNAL MEDICINE

## 2025-07-25 PROCEDURE — 63600175 PHARM REV CODE 636 W HCPCS: Mod: TB | Performed by: NURSE PRACTITIONER

## 2025-07-25 PROCEDURE — 99900031 HC PATIENT EDUCATION (STAT)

## 2025-07-25 PROCEDURE — 84100 ASSAY OF PHOSPHORUS: CPT | Performed by: INTERNAL MEDICINE

## 2025-07-25 PROCEDURE — 25000003 PHARM REV CODE 250

## 2025-07-25 PROCEDURE — 80053 COMPREHEN METABOLIC PANEL: CPT | Performed by: INTERNAL MEDICINE

## 2025-07-25 PROCEDURE — 5A1D70Z PERFORMANCE OF URINARY FILTRATION, INTERMITTENT, LESS THAN 6 HOURS PER DAY: ICD-10-PCS | Performed by: INTERNAL MEDICINE

## 2025-07-25 PROCEDURE — 84484 ASSAY OF TROPONIN QUANT: CPT | Performed by: INTERNAL MEDICINE

## 2025-07-25 PROCEDURE — 27000221 HC OXYGEN, UP TO 24 HOURS

## 2025-07-25 PROCEDURE — 21400001 HC TELEMETRY ROOM

## 2025-07-25 PROCEDURE — 83735 ASSAY OF MAGNESIUM: CPT | Performed by: INTERNAL MEDICINE

## 2025-07-25 PROCEDURE — 25000003 PHARM REV CODE 250: Performed by: INTERNAL MEDICINE

## 2025-07-25 PROCEDURE — 99900035 HC TECH TIME PER 15 MIN (STAT)

## 2025-07-25 RX ORDER — MUPIROCIN 20 MG/G
OINTMENT TOPICAL 2 TIMES DAILY
Status: DISCONTINUED | OUTPATIENT
Start: 2025-07-25 | End: 2025-07-28 | Stop reason: HOSPADM

## 2025-07-25 RX ORDER — IPRATROPIUM BROMIDE AND ALBUTEROL SULFATE 2.5; .5 MG/3ML; MG/3ML
3 SOLUTION RESPIRATORY (INHALATION) EVERY 6 HOURS
Status: DISCONTINUED | OUTPATIENT
Start: 2025-07-25 | End: 2025-07-26

## 2025-07-25 RX ORDER — HYDRALAZINE HYDROCHLORIDE 20 MG/ML
10 INJECTION INTRAMUSCULAR; INTRAVENOUS ONCE
Status: COMPLETED | OUTPATIENT
Start: 2025-07-25 | End: 2025-07-25

## 2025-07-25 RX ORDER — ACETAMINOPHEN 500 MG
1000 TABLET ORAL EVERY 6 HOURS PRN
Status: DISCONTINUED | OUTPATIENT
Start: 2025-07-25 | End: 2025-07-28 | Stop reason: HOSPADM

## 2025-07-25 RX ORDER — GUAIFENESIN AND DEXTROMETHORPHAN HYDROBROMIDE 10; 100 MG/5ML; MG/5ML
5 SYRUP ORAL EVERY 4 HOURS PRN
Status: DISCONTINUED | OUTPATIENT
Start: 2025-07-25 | End: 2025-07-28 | Stop reason: HOSPADM

## 2025-07-25 RX ORDER — HYDRALAZINE HYDROCHLORIDE 20 MG/ML
10 INJECTION INTRAMUSCULAR; INTRAVENOUS EVERY 6 HOURS PRN
Status: DISCONTINUED | OUTPATIENT
Start: 2025-07-25 | End: 2025-07-28 | Stop reason: HOSPADM

## 2025-07-25 RX ORDER — GUAIFENESIN AND DEXTROMETHORPHAN HYDROBROMIDE 10; 100 MG/5ML; MG/5ML
5 SYRUP ORAL ONCE
Status: COMPLETED | OUTPATIENT
Start: 2025-07-25 | End: 2025-07-25

## 2025-07-25 RX ORDER — LABETALOL HYDROCHLORIDE 5 MG/ML
10 INJECTION, SOLUTION INTRAVENOUS EVERY 6 HOURS PRN
Status: DISCONTINUED | OUTPATIENT
Start: 2025-07-25 | End: 2025-07-28 | Stop reason: HOSPADM

## 2025-07-25 RX ADMIN — CLONIDINE HYDROCHLORIDE 0.3 MG: 0.2 TABLET ORAL at 09:07

## 2025-07-25 RX ADMIN — SEVELAMER CARBONATE 800 MG: 800 TABLET, FILM COATED ORAL at 05:07

## 2025-07-25 RX ADMIN — SEVELAMER CARBONATE 800 MG: 800 TABLET, FILM COATED ORAL at 12:07

## 2025-07-25 RX ADMIN — CLONIDINE HYDROCHLORIDE 0.3 MG: 0.2 TABLET ORAL at 05:07

## 2025-07-25 RX ADMIN — HYDRALAZINE HYDROCHLORIDE 10 MG: 20 INJECTION INTRAMUSCULAR; INTRAVENOUS at 12:07

## 2025-07-25 RX ADMIN — HYDRALAZINE HYDROCHLORIDE 25 MG: 25 TABLET ORAL at 08:07

## 2025-07-25 RX ADMIN — LINACLOTIDE 145 MCG: 145 CAPSULE, GELATIN COATED ORAL at 09:07

## 2025-07-25 RX ADMIN — LEVOTHYROXINE SODIUM 50 MCG: 0.05 TABLET ORAL at 09:07

## 2025-07-25 RX ADMIN — HYDRALAZINE HYDROCHLORIDE 25 MG: 25 TABLET ORAL at 09:07

## 2025-07-25 RX ADMIN — PANTOPRAZOLE SODIUM 40 MG: 40 TABLET, DELAYED RELEASE ORAL at 09:07

## 2025-07-25 RX ADMIN — GUAIFENESIN AND DEXTROMETHORPHAN 5 ML: 100; 10 SYRUP ORAL at 05:07

## 2025-07-25 RX ADMIN — ATORVASTATIN CALCIUM 40 MG: 40 TABLET, FILM COATED ORAL at 09:07

## 2025-07-25 RX ADMIN — HYDRALAZINE HYDROCHLORIDE 10 MG: 20 INJECTION INTRAMUSCULAR; INTRAVENOUS at 05:07

## 2025-07-25 RX ADMIN — EPOETIN ALFA-EPBX 20000 UNITS: 20000 INJECTION, SOLUTION INTRAVENOUS; SUBCUTANEOUS at 12:07

## 2025-07-25 RX ADMIN — AMLODIPINE BESYLATE 10 MG: 5 TABLET ORAL at 09:07

## 2025-07-25 RX ADMIN — SEVELAMER CARBONATE 800 MG: 800 TABLET, FILM COATED ORAL at 09:07

## 2025-07-25 RX ADMIN — BUSPIRONE HYDROCHLORIDE 10 MG: 5 TABLET ORAL at 05:07

## 2025-07-25 RX ADMIN — MUPIROCIN: 20 OINTMENT TOPICAL at 08:07

## 2025-07-25 RX ADMIN — ACETAMINOPHEN 1000 MG: 500 TABLET ORAL at 04:07

## 2025-07-25 RX ADMIN — IPRATROPIUM BROMIDE AND ALBUTEROL SULFATE 3 ML: .5; 3 SOLUTION RESPIRATORY (INHALATION) at 08:07

## 2025-07-25 RX ADMIN — CLONIDINE HYDROCHLORIDE 0.3 MG: 0.2 TABLET ORAL at 11:07

## 2025-07-25 RX ADMIN — BUSPIRONE HYDROCHLORIDE 10 MG: 5 TABLET ORAL at 11:07

## 2025-07-25 RX ADMIN — BUSPIRONE HYDROCHLORIDE 10 MG: 5 TABLET ORAL at 09:07

## 2025-07-25 NOTE — PLAN OF CARE
Problem: Adult Inpatient Plan of Care  Goal: Plan of Care Review  Outcome: Progressing  Goal: Patient-Specific Goal (Individualized)  Outcome: Progressing  Goal: Optimal Comfort and Wellbeing  Outcome: Progressing  Goal: Readiness for Transition of Care  Outcome: Progressing     Problem: Hemodialysis  Goal: Safe, Effective Therapy Delivery  Outcome: Progressing  Goal: Effective Tissue Perfusion  Outcome: Progressing  Goal: Absence of Infection Signs and Symptoms  Outcome: Progressing

## 2025-07-25 NOTE — PROGRESS NOTES
Inpatient Nutrition Assessment    Admit Date: 7/24/2025   Total duration of encounter: 1 day   Patient Age: 44 y.o.    Nutrition Recommendation/Prescription     Continue current diet (Diet Renal On Dialysis Standard Tray) as tolerated.   Add Novasource Renal (provides 475 kcal, 22 g protein per serving) BID    Continue phosphate binder with meals as medically feasible.     Communication of Recommendations: reviewed with patient    Nutrition Assessment     Malnutrition Assessment/Nutrition-Focused Physical Exam       Malnutrition Level: other (see comments) (Does not meet criteria) (07/25/25 1050)                                                        A minimum of two characteristics is recommended for diagnosis of either severe or non-severe malnutrition.    Chart Review    Reason Seen: malnutrition screening tool (MST)    Malnutrition Screening Tool Results   Have you recently lost weight without trying?: Unsure  Have you been eating poorly because of a decreased appetite?: No   MST Score: 2   Diagnosis:  ESRD on HD  Anemia  Medical noncompliance     Relevant Medical History:    ESRD on HD, hypertension, anemia secondary to chronic kidney disease, hyperlipidemia, hypothyroidism, and substance abuse (discontinued use in 2008)     Scheduled Medications:  amLODIPine, 10 mg, Daily  atorvastatin, 40 mg, Daily  busPIRone, 10 mg, TID  cloNIDine, 0.3 mg, TID  epoetin zoraida-ebpx (RETACRIT) injection, 20,000 Units, Every Mon, Wed, Fri  hydrALAZINE, 25 mg, BID  levothyroxine, 50 mcg, Daily  linaCLOtide, 145 mcg, Daily  pantoprazole, 40 mg, Daily  sevelamer carbonate, 800 mg, TID WM    Continuous Infusions:   PRN Medications:  acetaminophen, 1,000 mg, Q6H PRN  albuterol, 1 puff, Q6H PRN  docusate sodium, 100 mg, BID PRN  hydrALAZINE, 10 mg, Q6H PRN  ondansetron, 8 mg, Q8H PRN    Calorie Containing IV Medications: no significant kcals from medications at this time    Recent Labs   Lab 07/24/25  1411 07/24/25  1429 07/25/25  0234  "  NA  --  142 143   K  --  4.1 4.2   CALCIUM  --  9.9 9.5   PHOS  --   --  8.9*   MG  --   --  3.00*   CL  --  102 103   CO2  --  20* 20*   BUN  --  90.1* 94.8*   CREATININE  --  28.50* 29.80*   EGFRNORACEVR  --  1 1   GLU  --  103* 104*   BILITOT  --  0.8 0.7   ALKPHOS  --  77 68   ALT  --  17 12   AST  --  15 13   ALBUMIN  --  4.1 3.7   WBC 7.54  --  7.05   HGB 7.7*  --  7.6*   HCT 23.7*  --  23.1*     Nutrition Orders:  Diet Renal On Dialysis Standard Tray      Appetite/Oral Intake: fair/25-50% of meals  Factors Affecting Nutritional Intake: decreased appetite  Social Needs Impacting Access to Food: none identified  Food/Mosque/Cultural Preferences: none reported  Food Allergies: no known food allergies  Last Bowel Movement: 07/23/25  Wound(s):  no partial or full thickness pressure injuries documented    Comments    7/25/25: Pt reports fair appetite today, no issues PTA. Pt states her dry weight has been increasing some recently but unable to confirm UBW. Agreeable to strawberry flavored ONS with meals until appetite stabilizes.     Anthropometrics    Height: 5' 4" (162.6 cm), Height Method: Measured  Last Weight: 95.6 kg (210 lb 12.8 oz) (07/25/25 0400), Weight Method: Standard Scale  BMI (Calculated): 36.2  BMI Classification: obese grade II (BMI 35-39.9)     Ideal Body Weight (IBW), Female: 120 lb     % Ideal Body Weight, Female (lb): 178.92 %                             Usual Weight Provided By: patient denies unintentional weight loss    Wt Readings from Last 5 Encounters:   07/25/25 95.6 kg (210 lb 12.8 oz)   04/11/24 88 kg (194 lb 0.1 oz)   11/15/18 95 kg (209 lb 7 oz)   10/04/18 94.8 kg (209 lb)   10/01/18 95.3 kg (210 lb)     Weight Change(s) Since Admission:   Wt Readings from Last 1 Encounters:   07/25/25 0400 95.6 kg (210 lb 12.8 oz)   07/24/25 2130 97.4 kg (214 lb 11.2 oz)   07/24/25 1242 90.7 kg (200 lb)   Admit Weight: 90.7 kg (200 lb) (07/24/25 1242), Weight Method: Stated    Estimated " Needs    Weight Used For Calorie Calculations: 95.6 kg (210 lb 12.2 oz)  Energy Calorie Requirements (kcal): 1903 kcals (1.2 SFxMSJ)  Energy Need Method: Snyder-St Jeor  Weight Used For Protein Calculations: 95.6 kg (210 lb 12.2 oz)  Protein Requirements:  g (1-1.2 g/kg)  Fluid Requirements (mL): 1903 mL (1 mL/kcal) or per MD        Enteral Nutrition     Patient not receiving enteral nutrition at this time.    Parenteral Nutrition     Patient not receiving parenteral nutrition support at this time.    Evaluation of Received Nutrient Intake    Calories: not meeting estimated needs  Protein: not meeting estimated needs    Patient Education     Not applicable.    Nutrition Diagnosis     PES: Inadequate energy intake related to acute illness as evidenced by decreased appetite, meeting <75% EEN since admit. (new)     PES:            Nutrition Interventions     Intervention(s): Mineral modified diet, Protein modified diet, and Medical food supplement therapy  Intervention(s):      Goal: Meet greater than 80% of nutritional needs by follow-up. (new)  Goal: Consume % of oral supplements by follow-up. (new)    Nutrition Goals & Monitoring     Dietitian will monitor: energy intake, weight, electrolyte/renal panel, glucose/endocrine profile, and gastrointestinal profile  Discharge planning: continue Renal diet  Nutrition Risk/Follow-Up: dietitian will follow-up one time per week   Please consult if re-assessment needed sooner.

## 2025-07-25 NOTE — PLAN OF CARE
07/25/25 1100   Discharge Assessment   Assessment Type Discharge Planning Assessment   Confirmed/corrected address, phone number and insurance Yes   Confirmed Demographics Correct on Facesheet   Source of Information patient   Communicated BERNIE with patient/caregiver Date not available/Unable to determine   People in Home significant other   Name(s) of People in Home Jermain Izquierdo (Significant other)  [phone] (   Do you expect to return to your current living situation? Yes   Do you have help at home or someone to help you manage your care at home? Yes   Who are your caregiver(s) and their phone number(s)? Jermain Izquierdo (Significant other)  [phone] (   Prior to hospitilization cognitive status: Alert/Oriented   Current cognitive status: Alert/Oriented   Walking or Climbing Stairs Difficulty no   Dressing/Bathing Difficulty no   Home Accessibility stairs to enter home   Stair Railings, Main Entrance none   Equipment Currently Used at Home CPAP  (states she needs a new script for cpap and it needs tubing.)   Readmission within 30 days? No   Patient currently being followed by outpatient case management? No   Do you currently have service(s) that help you manage your care at home? No   Do you take prescription medications? Yes   Do you have prescription coverage? Yes   Do you have any problems affording any of your prescribed medications? No   Is the patient taking medications as prescribed? yes   Who is going to help you get home at discharge? Jermain Izquierdo (Significant other)  [phone] (   How do you get to doctors appointments? car, drives self   Are you on dialysis? Yes   Dialysis Name and Scheduled days [facility] 4 days/week   Do you take coumadin? No   Discharge Plan A Home with family   Discharge Plan B Home with family   DME Needed Upon Discharge  none   Discharge Plan discussed with: Patient   Transition of Care Barriers Does not adhere to care plan   Physical Activity   On average,  how many days per week do you engage in moderate to strenuous exercise (like a brisk walk)? 0 days   On average, how many minutes do you engage in exercise at this level? 0 min   Financial Resource Strain   How hard is it for you to pay for the very basics like food, housing, medical care, and heating? Not very   Housing Stability   In the last 12 months, was there a time when you were not able to pay the mortgage or rent on time? N   At any time in the past 12 months, were you homeless or living in a shelter (including now)? N   Food Insecurity   Within the past 12 months, you worried that your food would run out before you got the money to buy more. Never true   Within the past 12 months, the food you bought just didn't last and you didn't have money to get more. Never true   Stress   Do you feel stress - tense, restless, nervous, or anxious, or unable to sleep at night because your mind is troubled all the time - these days? Rather much   Social Isolation   How often do you feel lonely or isolated from those around you?  Never   Alcohol Use   Q1: How often do you have a drink containing alcohol? Never   Utilities   In the past 12 months has the electric, gas, oil, or water company threatened to shut off services in your home? No   Health Literacy   How often do you need to have someone help you when you read instructions, pamphlets, or other written material from your doctor or pharmacy? Rarely

## 2025-07-25 NOTE — NURSING
07/25/25 1630   Post-Hemodialysis Assessment   Blood Volume Processed (Liters) 63 L   Dialyzer Clearance Lightly streaked   Duration of Treatment 180 minutes   Total UF (mL) 4000 mL   Post-Hemodialysis Comments tx completed as ordered. Net 4L fluid removed. Tolerated well, VSS throughout. No bleeding at access site on departure.

## 2025-07-25 NOTE — PROGRESS NOTES
Dialysis coorindator met with patient at bedside, provided education and confirmed current outpatient dialysis schedule.       [facility]   Days: 4 [facility]     HHD  Outpatient dialysis center aware of admission. Dialysis Coordinator will continue to follow and assist as needed, and will notify outpatient center when ready for discharge and care coordination.    Chanell Woody  Dialysis Coordinator   [facility]  [phone]

## 2025-07-25 NOTE — CONSULTS
Nephrology Initial Consult Note    Patient Name: Ted Edwards  Age: 44 y.o.  : 1980  MRN: [id number]  Admission Date: 2025    Reason for Consult:      Chronic hemodialysis management    HPI:     Ted Edwards is a 44 y.o. female with a history of ESRD on HD, hypertension, anemia secondary to chronic kidney disease, hyperlipidemia, hypothyroidism, and substance abuse (discontinued use in ).  She presented to the ED yesterday with shortness of breath.  Patient is on hemodialysis and usually has hemodialysis 4 times a week and is followed by Dr. Peraza, patient reportedly has missed the last 8 treatments secondary to feeling ill with symptoms of bronchitis including productive cough.  In addition patient reported orthopnea, dyspnea on exertion, and peripheral edema.  Patient is current everyday smoker.  Chest x-ray revealed pulmonary edema with cardiomegaly.    Patient is lying in bed awake.  She is on nasal cannula at 2 L and reports comfort with breathing.  She does not wear home oxygen.  Patient reports that she continues to have a cough, it is nonproductive.  She reports history of allergic rhinitis and appears to have a postnasal drip.  She has not been taking any medications to treat symptoms but states the cough has been ongoing for several weeks.  She has no other physical complaints currently.  Phosphorus is elevated, patient is on Auryxia at home, she states that she does take as prescribed with meals.  She reports appetite has been good.  She reports having regular bowel movements.  She is anuric.    Current Medications[1]    No, Primary Doctor    Past Medical History:   Diagnosis Date    Abnormal Pap smear of cervix     Anemia     Chronic kidney disease     Dialysis patient     Disorder of kidney and ureter     Encounter for blood transfusion     1 time    Hyperlipidemia     Hypertension     Obesity     Pica in adults     eating baby powder    Substance abuse     cocaine use  -occassional use, stopped in     Thyroid disease       Past Surgical History:   Procedure Laterality Date    AV FISTULA PLACEMENT Right     CERVICAL BIOPSY  W/ LOOP ELECTRODE EXCISION       SECTION      x3    CHOLECYSTECTOMY      COLPOSCOPY      LAPAROSCOPIC TOTAL HYSTERECTOMY N/A 10/4/2018    Procedure: HYSTERECTOMY, TOTAL, LAPAROSCOPIC;  Surgeon: Carlo Espino MD;  Location: [facility];  Service: OB/GYN;  Laterality: N/A;  Converted to MARYLOU    LUNG SURGERY      left side fluid removal    thyroidectomy Left     partial    TONSILLECTOMY      TOTAL ABDOMINAL HYSTERECTOMY N/A 10/4/2018    Procedure: HYSTERECTOMY, TOTAL, ABDOMINAL;  Surgeon: Carlo Espino MD;  Location: [facility];  Service: OB/GYN;  Laterality: N/A;    TUBAL LIGATION        Family History   Problem Relation Name Age of Onset    Clotting disorder Mother      Heart disease Father      Heart attack Father       Social History     Tobacco Use    Smoking status: Every Day     Current packs/day: 0.00     Average packs/day: 0.1 packs/day for 9.0 years (0.9 ttl pk-yrs)     Types: Cigarettes     Start date: 2007     Last attempt to quit: 2016     Years since quittin.8    Smokeless tobacco: Never   Substance Use Topics    Alcohol use: No     Alcohol/week: 0.0 standard drinks of alcohol     Prescriptions Prior to Admission[2]  Review of patient's allergies indicates:  No Known Allergies         Review of Systems:       Comprehensive 12-point ROS performed, all systems negative except those noted in the HPI.    Objective:       VITAL SIGNS: 24 HR MIN & MAX LAST    Temp  Min: 97.7 °F (36.5 °C)  Max: 98.6 °F (37 °C)  98.6 °F (37 °C)        BP  Min: 156/92  Max: 185/93  (!) 172/88     Pulse  Min: 75  Max: 90  90     Resp  Min: 17  Max: 24  18    SpO2  Min: 94 %  Max: 100 %  95 %      Physical Exam  Vitals reviewed.   Constitutional:       General: She is not in acute distress.  HENT:      Head: Normocephalic.      Right Ear: Hearing normal.       Left Ear: Hearing normal.      Nose: Nose normal.      Mouth/Throat:      Lips: Pink.      Mouth: Mucous membranes are moist.   Cardiovascular:      Rate and Rhythm: Normal rate and regular rhythm.      Heart sounds: S1 normal and S2 normal.      Comments: 1-2+ edema to bilateral lower extremities  Pulmonary:      Effort: Pulmonary effort is normal. No respiratory distress.      Breath sounds: Normal breath sounds and air entry. No wheezing or rhonchi.   Abdominal:      General: Bowel sounds are normal.      Palpations: Abdomen is soft.      Tenderness: There is no abdominal tenderness.   Musculoskeletal:      Right lower leg: Edema present.      Left lower leg: Edema present.   Skin:     General: Skin is warm and dry.   Neurological:      Mental Status: She is alert and oriented to person, place, and time.   Psychiatric:         Attention and Perception: Attention normal.         Mood and Affect: Mood and affect normal.         Speech: Speech normal.         Behavior: Behavior normal. Behavior is cooperative.         Cognition and Memory: Cognition normal.         Dialysis access:  Left upper arm AVF with positive bruit, positive thrill          Component Value Date/Time     07/25/2025 0239     07/24/2025 1429     (L) 05/13/2023 0603     05/10/2023 0430     10/05/2018 0453     10/04/2018 0558    K 4.2 07/25/2025 0239    K 4.1 07/24/2025 1429    K 5.1 04/25/2025 0933    K 6.1 (HH) 05/13/2023 0603    K 6.1 (HH) 05/10/2023 0430    K 4.6 10/05/2018 0453    K 4.1 10/04/2018 0558    CO2 20 (L) 07/25/2025 0239    CO2 20 (L) 07/24/2025 1429    CO2 21 (L) 05/13/2023 0603    CO2 22 05/10/2023 0430    CO2 25 10/05/2018 0453    CO2 28 10/04/2018 0558    BUN 94.8 (H) 07/25/2025 0239    BUN 90.1 (H) 07/24/2025 1429    BUN 98 (H) 07/06/2025 0000    BUN 57 (H) 12/17/2024 0000    BUN 41 (H) 10/05/2018 0453    BUN 28 (H) 10/04/2018 0558    CREATININE 29.80 (H) 07/25/2025 0239    CREATININE 28.50  (H) 07/24/2025 1429    CREATININE 12.17 (H) 05/13/2023 0603    CREATININE 18.85 (H) 05/10/2023 0430    CREATININE 11.3 (H) 10/05/2018 0453    CREATININE 8.9 (H) 10/04/2018 0558    CALCIUM 9.5 07/25/2025 0239    CALCIUM 9.9 07/24/2025 1429    CALCIUM 9.6 06/09/2025 0000    CALCIUM 7.7 (L) 05/13/2023 0603    CALCIUM 8.2 (L) 10/05/2018 0453    CALCIUM 9.2 10/04/2018 0558    PHOS 8.9 (H) 07/25/2025 0239    PHOS 4.0 06/29/2016 0800            Component Value Date/Time    WBC 7.05 07/25/2025 0239    WBC 7.54 07/24/2025 1411    WBC 14.24 (H) 10/05/2018 0453    WBC 10.28 10/01/2018 1509    HGB 7.6 (L) 07/25/2025 0239    HGB 7.7 (L) 07/24/2025 1411    HGB 10 (L) 05/21/2025 0000    HGB 11.4 (L) 10/23/2024 0000    HGB 9.7 (L) 10/05/2018 0453    HGB 9.6 (L) 10/01/2018 1509    HCT 23.1 (L) 07/25/2025 0239    HCT 23.7 (L) 07/24/2025 1411    HCT 20.2 (L) 05/12/2023 1024    HCT 28.4 (L) 05/10/2023 0546    HCT 31.1 (L) 10/05/2018 0453    HCT 29.9 (L) 10/01/2018 1509     07/25/2025 0239     07/24/2025 1411     10/05/2018 0453     10/01/2018 1509         X-Ray Chest PA And Lateral   Final Result      Cardiomegaly and mild pulmonary edema.         Electronically signed by: Deng Michael   Date:    07/24/2025   Time:    13:11          Assessment / Plan:     ESRD on HD with fluid volume overload  Pulmonary edema  Hypertension  Anemia secondary to CKD  Tobacco use    Recommendations:   We will plan for hemodialysis today.  She reports at home she usually has approximately 3 L of fluid removal sometimes 3.5 L. we will attempt to remove 3-4 L today and continue MWF schedule while inpatient.  Will obtain iron studies with labs in the morning.  Start Retacrit with dialysis days.  Blood pressure elevated, home medications have been resumed, we will reassess for need of adjustments after hemodialysis today.      Thank you for this consult.                 [1]   Current Facility-Administered Medications   Medication  Dose Route Frequency Provider Last Rate Last Admin    acetaminophen tablet 1,000 mg  1,000 mg Oral Q6H PRN Roger Draper FNP   1,000 mg at 07/25/25 0451    albuterol inhaler 1 puff  1 puff Inhalation Q6H PRN Willy Moran MD        amLODIPine tablet 10 mg  10 mg Oral Daily Willy Moran MD        atorvastatin tablet 40 mg  40 mg Oral Daily Willy Moran MD        busPIRone tablet 10 mg  10 mg Oral TID Willy Moran MD        cloNIDine tablet 0.3 mg  0.3 mg Oral TID Willy Moran MD        docusate sodium capsule 100 mg  100 mg Oral BID PRN Willy Moran MD        hydrALAZINE injection 10 mg  10 mg Intravenous Q6H PRN Roger Draper FNP   10 mg at 07/25/25 0009    hydrALAZINE tablet 25 mg  25 mg Oral BID Willy Moran MD        levothyroxine tablet 50 mcg  50 mcg Oral Daily Willy Moran MD        linaCLOtide capsule 145 mcg  145 mcg Oral Daily Willy Moran MD        ondansetron disintegrating tablet 8 mg  8 mg Oral Q8H PRN Willy Moran MD        pantoprazole EC tablet 40 mg  40 mg Oral Daily Willy Moran MD        sevelamer carbonate tablet 800 mg  800 mg Oral TID WM Willy Moran MD       [2]   Facility-Administered Medications Prior to Admission   Medication Dose Route Frequency Provider Last Rate Last Admin    ondansetron disintegrating tablet 8 mg  8 mg Oral Q8H PRN Jaky Montiel MD        promethazine (PHENERGAN) 12.5 mg in dextrose 5 % 50 mL IVPB  12.5 mg Intravenous Q6H PRN Jaky Montiel MD         Medications Prior to Admission   Medication Sig Dispense Refill Last Dose/Taking    albuterol (PROVENTIL/VENTOLIN HFA) 90 mcg/actuation inhaler Inhale 1 puff into the lungs every 6 (six) hours as needed for Wheezing or Shortness of Breath.   Taking As Needed    amLODIPine (NORVASC) 10 MG tablet Take 10 mg by mouth once daily.   Taking    fluticasone propionate (FLONASE) 50 mcg/actuation nasal spray 2 sprays by Each Nostril route daily as needed.   Taking As  Needed    acetaminophen (TYLENOL) 325 MG tablet Take 2 tablets (650 mg total) by mouth every 8 (eight) hours as needed for Pain.  0     busPIRone (BUSPAR) 10 MG tablet Take 10 mg by mouth 2 (two) times daily.       clonazePAM (KLONOPIN) 1 MG tablet Take 1 mg by mouth nightly as needed.       cloNIDine (CATAPRES) 0.3 MG tablet Take 0.3 mg by mouth 3 (three) times daily.       DOCOSAHEXAENOIC ACID ORAL Take 1 capsule by mouth once daily.       docusate sodium (COLACE) 100 MG capsule Take 100 mg by mouth 2 (two) times daily as needed for Constipation.       ferric citrate (AURYXIA) 210 mg iron Tab Take 3 tablets by mouth 3 (three) times daily before meals.       hydrALAZINE (APRESOLINE) 25 MG tablet Take 25 mg by mouth 2 (two) times daily.       levothyroxine (SYNTHROID) 50 MCG tablet Take 50 mcg by mouth once daily.       linaCLOtide (LINZESS) 145 mcg Cap capsule Take 145 mcg by mouth once daily.       omeprazole (PRILOSEC) 40 MG capsule Take 40 mg by mouth every morning.       plecanatide (TRULANCE) 3 mg Tab Take 3 mg by mouth once daily.       rosuvastatin (CRESTOR) 10 MG tablet Take 10 mg by mouth once daily.

## 2025-07-25 NOTE — H&P
[facility] Medicine History & Physical Examination       Patient Name: Ted Edwards  MRN: [id number]  Patient Class: IP- Inpatient   Admission Date: 7/24/2025   Admitting Physician: JERRY Service   Length of Stay: 0  Attending Physician: Willy Moran MD  Primary Care Provider: Dr Jared WEI  Face-to-Face encounter date: 07/24/2025  Code Status:Full code   Chief Complaint: Shortness of Breath (Steady gait to triage c/o worsening SOB and swelling for over 2 weeks. Reports intermittent episodes of palpitations. HX of CKD on dialysis )      Screening for Social Drivers for health:  Patient screened for food insecurity, housing instability, transportation needs, utility difficulties, and interpersonal safety (select all that apply as identified as concern)  []Housing or Food  []Transportation Needs  []Utility Difficulties  []Interpersonal safety  [x]None      Patient information was obtained from patient, patient's family, past medical records and ER records.  ED records were reviewed in detail and documented below    HISTORY OF PRESENT ILLNESS:   Ted Edwards is a 44 y.o. female who  has a past medical history of Abnormal Pap smear of cervix, Anemia, Chronic kidney disease, Dialysis patient, Disorder of kidney and ureter, Encounter for blood transfusion, Hyperlipidemia, Hypertension, Obesity, Pica in adults, Substance abuse, and Thyroid disease.. The patient presented to [facility] on 7/24/2025 with a primary complaint of :      Patient is a 44-year-old  female with a history of hypertension, hyperlipidemia, obesity, thyroid disease and end-stage renal disease with dialysis at home 4 times a week.  Patient refers she has missed 8 dialysis treatments secondary to not feeling well, bronchitis.  Decided to come to the emergency room for evaluation with findings of volume overload.  Chest x-ray with pulmonary edema/cardiomegaly.  Patient complains of  cough/orthopnea/dyspnea on exertion/PND/peripheral edema.  Current smoker.  Patient is sitting at edge of bed    Primary nephrologist- Karmen norman MD- Dr COTO( transitioning to a new MD)      PAST MEDICAL HISTORY:     Past Medical History:   Diagnosis Date    Abnormal Pap smear of cervix     Anemia     Chronic kidney disease     Dialysis patient     Disorder of kidney and ureter     Encounter for blood transfusion     1 time    Hyperlipidemia     Hypertension     Obesity     Pica in adults     eating baby powder    Substance abuse     cocaine use -occassional use, stopped in     Thyroid disease        PAST SURGICAL HISTORY:     Past Surgical History:   Procedure Laterality Date    AV FISTULA PLACEMENT Right     CERVICAL BIOPSY  W/ LOOP ELECTRODE EXCISION       SECTION      x3    CHOLECYSTECTOMY      COLPOSCOPY      LAPAROSCOPIC TOTAL HYSTERECTOMY N/A 10/4/2018    Procedure: HYSTERECTOMY, TOTAL, LAPAROSCOPIC;  Surgeon: Carlo Espino MD;  Location: [facility];  Service: OB/GYN;  Laterality: N/A;  Converted to MARYLOU    LUNG SURGERY      left side fluid removal    thyroidectomy Left     partial    TONSILLECTOMY      TOTAL ABDOMINAL HYSTERECTOMY N/A 10/4/2018    Procedure: HYSTERECTOMY, TOTAL, ABDOMINAL;  Surgeon: Carlo Espino MD;  Location: [facility];  Service: OB/GYN;  Laterality: N/A;    TUBAL LIGATION         ALLERGIES:   Patient has no known allergies.    FAMILY HISTORY:   Reviewed and negative    SOCIAL HISTORY:     Social History     Tobacco Use    Smoking status: Every Day     Current packs/day: 0.00     Average packs/day: 0.1 packs/day for 9.0 years (0.9 ttl pk-yrs)     Types: Cigarettes     Start date: 2007     Last attempt to quit: 2016     Years since quittin.8    Smokeless tobacco: Never   Substance Use Topics    Alcohol use: No     Alcohol/week: 0.0 standard drinks of alcohol        HOME MEDICATIONS:     Prior to Admission medications    Medication Sig Start Date End Date Taking?  Authorizing Provider   albuterol (PROVENTIL/VENTOLIN HFA) 90 mcg/actuation inhaler Inhale 1 puff into the lungs every 6 (six) hours as needed for Wheezing or Shortness of Breath. 6/9/25  Yes Provider, Historical   amLODIPine (NORVASC) 10 MG tablet Take 10 mg by mouth once daily. 6/27/25  Yes Provider, Historical   fluticasone propionate (FLONASE) 50 mcg/actuation nasal spray 2 sprays by Each Nostril route daily as needed. 6/6/25  Yes Provider, Historical   acetaminophen (TYLENOL) 325 MG tablet Take 2 tablets (650 mg total) by mouth every 8 (eight) hours as needed for Pain. 12/28/16   Shellie Swan MD   busPIRone (BUSPAR) 10 MG tablet Take 10 mg by mouth 2 (two) times daily.    Provider, Historical   clonazePAM (KLONOPIN) 1 MG tablet Take 1 mg by mouth nightly as needed.    Provider, Historical   cloNIDine (CATAPRES) 0.3 MG tablet Take 0.3 mg by mouth 3 (three) times daily.    Provider, Historical   DOCOSAHEXAENOIC ACID ORAL Take 1 capsule by mouth once daily.    Provider, Historical   docusate sodium (COLACE) 100 MG capsule Take 100 mg by mouth 2 (two) times daily as needed for Constipation.    Provider, Historical   ferric citrate (AURYXIA) 210 mg iron Tab Take 3 tablets by mouth 3 (three) times daily before meals.    Provider, Historical   hydrALAZINE (APRESOLINE) 25 MG tablet Take 25 mg by mouth 2 (two) times daily.    Provider, Historical   levothyroxine (SYNTHROID) 50 MCG tablet Take 50 mcg by mouth once daily.    Provider, Historical   linaCLOtide (LINZESS) 145 mcg Cap capsule Take 145 mcg by mouth once daily.    Provider, Historical   omeprazole (PRILOSEC) 40 MG capsule Take 40 mg by mouth every morning.    Provider, Historical   plecanatide (TRULANCE) 3 mg Tab Take 3 mg by mouth once daily.    Provider, Historical   rosuvastatin (CRESTOR) 10 MG tablet Take 10 mg by mouth once daily.    Provider, Historical   amlodipine (NORVASC) 5 MG tablet Take 5 mg by mouth once daily.  7/24/25  Provider, Historical    aspirin (ECOTRIN) 81 MG EC tablet Take 81 mg by mouth once daily.  7/24/25  Provider, Historical   cholecalciferol, vitamin D3, (VITAMIN D3) 400 unit Chew Take 3 tablets by mouth once daily.  7/24/25  Provider, Historical   cloNIDine (CATAPRES) 0.2 MG tablet Take 0.2 mg by mouth 3 (three) times daily.  7/24/25  Provider, Historical   cyclobenzaprine (FLEXERIL) 10 MG tablet Take 10 mg by mouth 3 (three) times daily as needed for Muscle spasms.  7/24/25  Provider, Historical   HYDROcodone-acetaminophen (NORCO)  mg per tablet Take 1 tablet by mouth every 6 (six) hours as needed for Pain. 10/6/18 7/24/25  Valdemar Sahu MD   ipratropium-albuterol (COMBIVENT)  mcg/actuation inhaler Inhale 1 puff into the lungs every 6 (six) hours as needed for Wheezing.   7/24/25  Provider, Historical   omega-3 fatty acids-vitamin E (FISH OIL) 1,000 mg Cap Take 1 capsule by mouth once daily.  7/24/25  Provider, Historical   pantoprazole (PROTONIX) 40 MG tablet  8/28/18 7/24/25  Provider, Historical   rosuvastatin (CRESTOR) 10 MG tablet  9/7/18 7/24/25  Provider, Historical   sevelamer carbonate (RENVELA) 800 mg Tab Take 4,000 mg by mouth 3 (three) times daily with meals. 2400 mg with snacks  7/24/25  Provider, Historical   terbinafine HCl (LAMISIL) 250 mg tablet  8/1/18 7/24/25  Provider, Historical   zolpidem (AMBIEN) 10 mg Tab  7/31/18 7/24/25  Provider, Historical       REVIEW OF SYSTEMS:   Except as documented, all other systems reviewed and negative     PHYSICAL EXAM:     VITAL SIGNS: 24 HRS MIN & MAX LAST   Temp  Min: 97.7 °F (36.5 °C)  Max: 98.2 °F (36.8 °C) 97.7 °F (36.5 °C)   BP  Min: 156/92  Max: 179/100 (!) 156/92   Pulse  Min: 75  Max: 87  83   Resp  Min: 17  Max: 24 18   SpO2  Min: 95 %  Max: 100 % 99 %     General appearance: Well-developed, well-nourished female in no apparent distress.  HENT: Atraumatic head. Moist mucous membranes of oral cavity.  Eyes: Normal extraocular movements.   Neck: Supple.    Lungs:  Crackles all the way up to mid lungs bilaterally No wheezing present.   Heart: Regular rate and rhythm. S1 and S2 present with no murmurs/gallop/rub. No pedal edema. No JVD present.   Abdomen: Soft, non-distended, non-tender. No rebound tenderness/guarding. Bowel sounds are normal.   Extremities: No cyanosis, clubbing, 3+ edema.  Skin: No Rash.   Neuro: Motor and sensory exams grossly intact. Good tone. Muscle strength 5/5 in all 4 extremities  Psych/mental status: Appropriate mood and affect. Responds appropriately to questions.     LABS AND IMAGING:     Recent Labs   Lab 07/24/25  1411   WBC 7.54   RBC 2.46*   HGB 7.7*   HCT 23.7*   MCV 96.3*   MCH 31.3*   MCHC 32.5*   RDW 15.4      MPV 9.8       Recent Labs   Lab 07/24/25  1429      K 4.1      CO2 20*   BUN 90.1*   CREATININE 28.50*   *   CALCIUM 9.9   ALBUMIN 4.1   PROT 7.9   ALKPHOS 77   ALT 17   AST 15   BILITOT 0.8       Microbiology Results (last 7 days)       ** No results found for the last 168 hours. **             X-Ray Chest PA And Lateral  Narrative: EXAMINATION:  XR CHEST PA AND LATERAL    CLINICAL HISTORY:  Shortness of breath    TECHNIQUE:  Two-view    COMPARISON:  December 20, 2016..    FINDINGS:  Cardiopericardial silhouette is enlarged there is mild pulmonary edema with subtle ground-glass opacities.  Right basilar pleuroparenchymal scarring versus small pleural effusion and lower lung zone atelectasis.  There is no pneumothorax.  Impression: Cardiomegaly and mild pulmonary edema.    Electronically signed by: Deng Michael  Date:    07/24/2025  Time:    13:11      ASSESSMENT & PLAN:   1.-history of end-stage renal disease/hemodialysis at home 4 times a week through right upper extremity hemodialysis access that has missed 8 dialysis treatments secondary to not feeling well, now presenting with volume overload, symptomatic.  -cardiomegaly/pulmonary edema by chest x-rays/3+ edema lower extremities  -we will notify  primary nephrologist    2.-anemia, worsening    3.-medical noncompliance    4.-history of hypertensive vascular disease/hyperlipidemia/obesity/tobacco abuse/thyroid disease      Tobacco cessation counseling was given for around 8 minutes. Options / al;ternatives discussed briefly         VTE Prophylaxis: scd  Patient condition: Fair    __________________________________________________________________________  INPATIENT LIST OF MEDICATIONS     Scheduled Meds:  Continuous Infusions:  PRN Meds:.        Discharge Planning and Disposition: No mobility needs. Ambulating well. Good social support system.   Anticipated discharge    If patient was admitted under observational status it is with my approval/permission.        All diagnosis and differential diagnosis have been reviewed; assessment and plan has been documented; I have personally reviewed the labs and test results that are presently available; I have reviewed the patients medication list; I have reviewed the consulting providers response and recommendations. I have reviewed or attempted to review medical records based upon their availability.    All of the patient and family questions have been addressed and answered. Patient's is agreeable to the above stated plan. I will continue to monitor closely and make adjustments to medical management as needed.    If patient was admitted under observational status it is with my approval/permission.      Willy Moran MD   07/24/2025

## 2025-07-25 NOTE — PROGRESS NOTES
[facility] Hospital Medicine Progress Note        Chief Complaint: Inpatient Follow-up     HPI: Ted Edwards is a 44 y.o. female who  has a past medical history of Abnormal Pap smear of cervix, Anemia, Chronic kidney disease, Dialysis patient, Disorder of kidney and ureter, Encounter for blood transfusion, Hyperlipidemia, Hypertension, Obesity, Pica in adults, Substance abuse, and Thyroid disease.. The patient presented to [facility] on 7/24/2025 with a primary complaint of :        Patient is a 44-year-old  female with a history of hypertension, hyperlipidemia, obesity, thyroid disease and end-stage renal disease with dialysis at home 4 times a week.  Patient refers she has missed 8 dialysis treatments secondary to not feeling well, bronchitis.  Decided to come to the emergency room for evaluation with findings of volume overload.  Chest x-ray with pulmonary edema/cardiomegaly.  Patient complains of cough/orthopnea/dyspnea on exertion/PND/peripheral edema.  Current smoker.  Patient is sitting at edge of bed     Primary nephrologist- Karmen ROY  1rjane MD- Dr COTO( transitioning to a new MD)    Interval Hx:   Nephrology following, started on dialysis    Patient was seen and examined, denied any major concerns except for cough, refused blood transfusion, chart was reviewed, T-max of 98.6°.  Blood pressure slightly on the higher side, hemoglobin 7.6      Objective/physical exam:  General: In no acute distress, afebrile  Chest: Clear to auscultation bilaterally  Heart:  +S1, S2, no appreciable murmur  Abdomen: Soft, nontender, BS +  MSK: Warm, + lower extremity edema, no clubbing or cyanosis  Neurologic: Alert and oriented x4, Cranial nerve II-XII intact, Strength 5/5 in all 4 extremities    VITAL SIGNS: 24 HRS MIN & MAX LAST   Temp  Min: 97.7 °F (36.5 °C)  Max: 98.6 °F (37 °C) 98.2 °F (36.8 °C)   BP  Min: 156/92  Max: 185/93 (!) 176/94   Pulse  Min: 78  Max: 90  83   Resp   Min: 17  Max: 24 18   SpO2  Min: 94 %  Max: 99 % (!) 94 %     I have reviewed the following labs:  Recent Labs   Lab 07/24/25  1411 07/25/25  0239   WBC 7.54 7.05   RBC 2.46* 2.44*   HGB 7.7* 7.6*   HCT 23.7* 23.1*   MCV 96.3* 94.7*   MCH 31.3* 31.1*   MCHC 32.5* 32.9*   RDW 15.4 14.9    176   MPV 9.8 10.2     Recent Labs   Lab 07/24/25  1429 07/25/25  0239    143   K 4.1 4.2    103   CO2 20* 20*   BUN 90.1* 94.8*   CREATININE 28.50* 29.80*   * 104*   CALCIUM 9.9 9.5   MG  --  3.00*   ALBUMIN 4.1 3.7   PROT 7.9 6.9   ALKPHOS 77 68   ALT 17 12   AST 15 13   BILITOT 0.8 0.7     Microbiology Results (last 7 days)       ** No results found for the last 168 hours. **             See below for Radiology    Assessment/Plan:  Pulmonary edema  Volume overload   ESRD on hemodialysis-missed dialysis  Anemia, unspecified, possibly due to CKD  Hypertension  NSTEMI, unspecified  Tobacco use    History of hyperlipidemia/thyroid use     Plan:  Bronchodilators p.r.n. Antitussives p.r.n.  Nephrology was consulted for dialysis.  Needs fluid removal.  Monitor BMP  Keep hemoglobin above 7.  Refuse any blood transfusions.  Follow up on iron studies.  Monitor for any signs of GI loss. Retacrit with dialysis .   Continue to monitor blood pressure, home medications resumed, up titrate regimen as needed-can likely go up on hydralazine.  Also on p.r.n. antihypertensives if needed.  Follow up echo,trend trop, continue telemetry monitoring      Critical care Time Spent>35 min  Uncontrolled blood pressure requiring IV antihypertensives    VTE prophylaxis:  Lovenox        Anticipated discharge and Disposition:   To be decided      All diagnosis and differential diagnosis have been reviewed; assessment and plan has been documented; I have personally reviewed the labs and test results that are presently available; I have reviewed the patients medication list; I have reviewed the consulting providers response and  recommendations. I have reviewed or attempted to review medical records based upon their availability    All of the patient's questions have been  addressed and answered. Patient's is agreeable to the above stated plan. I will continue to monitor closely and make adjustments to medical management as needed.    Portions of this note dictated using EMR integrated voice recognition software, and may be subject to voice recognition errors not corrected at proofreading. Please contact writer for clarification if needed.   _____________________________________________________________________    Malnutrition Status:  Nutrition consulted. Most recent weight and BMI monitored-     Measurements:  Wt Readings from Last 1 Encounters:   07/25/25 95.6 kg (210 lb 12.8 oz)   Body mass index is 36.18 kg/m².    Patient has been screened and assessed by RD.    Malnutrition Type:  Context:    Level: other (see comments) (Does not meet criteria)    Malnutrition Characteristic Summary:       Interventions/Recommendations (treatment strategy):        Scheduled Med:   amLODIPine  10 mg Oral Daily    atorvastatin  40 mg Oral Daily    busPIRone  10 mg Oral TID    cloNIDine  0.3 mg Oral TID    epoetin zoraida-ebpx (RETACRIT) injection  20,000 Units Subcutaneous Every Mon, Wed, Fri    hydrALAZINE  25 mg Oral BID    levothyroxine  50 mcg Oral Daily    linaCLOtide  145 mcg Oral Daily    pantoprazole  40 mg Oral Daily    sevelamer carbonate  800 mg Oral TID WM      Continuous Infusions:     PRN Meds:    Current Facility-Administered Medications:     acetaminophen, 1,000 mg, Oral, Q6H PRN    albuterol, 1 puff, Inhalation, Q6H PRN    docusate sodium, 100 mg, Oral, BID PRN    hydrALAZINE, 10 mg, Intravenous, Q6H PRN    ondansetron, 8 mg, Oral, Q8H PRN     Radiology:  I have personally reviewed the following imaging and agree with the radiologist.     X-Ray Chest PA And Lateral  Narrative: EXAMINATION:  XR CHEST PA AND LATERAL    CLINICAL  HISTORY:  Shortness of breath    TECHNIQUE:  Two-view    COMPARISON:  December 20, 2016..    FINDINGS:  Cardiopericardial silhouette is enlarged there is mild pulmonary edema with subtle ground-glass opacities.  Right basilar pleuroparenchymal scarring versus small pleural effusion and lower lung zone atelectasis.  There is no pneumothorax.  Impression: Cardiomegaly and mild pulmonary edema.    Electronically signed by: Deng Michael  Date:    07/24/2025  Time:    13:11      Savita Wheeler MD  Department of Hospital Medicine   [facility]   07/25/2025

## 2025-07-26 LAB
ALBUMIN SERPL-MCNC: 3.8 G/DL (ref 3.5–5)
ALBUMIN/GLOB SERPL: 1.1 RATIO (ref 1.1–2)
ALP SERPL-CCNC: 87 UNIT/L (ref 40–150)
ALT SERPL-CCNC: 15 UNIT/L (ref 0–55)
ANION GAP SERPL CALC-SCNC: 16 MEQ/L
AORTIC SIZE INDEX: 1.5 CM/M2
APICAL FOUR CHAMBER EJECTION FRACTION: 60 %
APICAL TWO CHAMBER EJECTION FRACTION: 62 %
ASCENDING AORTA: 2.9 CM
AST SERPL-CCNC: 15 UNIT/L (ref 11–45)
AV INDEX (PROSTH): 0.66
AV MEAN GRADIENT: 12 MMHG
AV PEAK GRADIENT: 23 MMHG
AV VALVE AREA BY VELOCITY RATIO: 2.5 CM²
AV VALVE AREA: 2.5 CM²
AV VELOCITY RATIO: 0.67
BASOPHILS # BLD AUTO: 0.03 X10(3)/MCL
BASOPHILS NFR BLD AUTO: 0.4 %
BILIRUB SERPL-MCNC: 0.7 MG/DL
BSA FOR ECHO PROCEDURE: 2.1 M2
BUN SERPL-MCNC: 51.5 MG/DL (ref 7–18.7)
CALCIUM SERPL-MCNC: 9.6 MG/DL (ref 8.4–10.2)
CHLORIDE SERPL-SCNC: 98 MMOL/L (ref 98–107)
CO2 SERPL-SCNC: 26 MMOL/L (ref 22–29)
CREAT SERPL-MCNC: 18.36 MG/DL (ref 0.55–1.02)
CREAT/UREA NIT SERPL: 3
CV ECHO LV RWT: 0.75 CM
DOP CALC AO PEAK VEL: 2.4 M/S
DOP CALC AO VTI: 46 CM
DOP CALC LVOT AREA: 3.8 CM2
DOP CALC LVOT DIAMETER: 2.2 CM
DOP CALC LVOT PEAK VEL: 1.6 M/S
DOP CALC LVOT STROKE VOLUME: 115.9 CM3
DOP CALC MV VTI: 40.7 CM
DOP CALCLVOT PEAK VEL VTI: 30.5 CM
E/A RATIO: 0.89
E/E' RATIO: 19 M/S
ECHO LV POSTERIOR WALL: 1.9 CM (ref 0.6–1.1)
EOSINOPHIL # BLD AUTO: 0.08 X10(3)/MCL (ref 0–0.9)
EOSINOPHIL NFR BLD AUTO: 1.1 %
ERYTHROCYTE [DISTWIDTH] IN BLOOD BY AUTOMATED COUNT: 15.3 % (ref 11.5–17)
FERRITIN SERPL-MCNC: 2544.16 NG/ML (ref 4.63–204)
FRACTIONAL SHORTENING: 45.1 % (ref 28–44)
GFR SERPLBLD CREATININE-BSD FMLA CKD-EPI: 2 ML/MIN/1.73/M2
GLOBULIN SER-MCNC: 3.5 GM/DL (ref 2.4–3.5)
GLUCOSE SERPL-MCNC: 122 MG/DL (ref 74–100)
HCT VFR BLD AUTO: 24.6 % (ref 37–47)
HGB BLD-MCNC: 8.3 G/DL (ref 12–16)
IMM GRANULOCYTES # BLD AUTO: 0.05 X10(3)/MCL (ref 0–0.04)
IMM GRANULOCYTES NFR BLD AUTO: 0.7 %
INTERVENTRICULAR SEPTUM: 1.2 CM (ref 0.6–1.1)
IRON SATN MFR SERPL: 30 % (ref 20–50)
IRON SERPL-MCNC: 51 UG/DL (ref 50–170)
IVC DIAMETER: 0.9 CM
IVRT: 108 MSEC
LEFT ATRIUM AREA SYSTOLIC (APICAL 2 CHAMBER): 28.2 CM2
LEFT ATRIUM AREA SYSTOLIC (APICAL 4 CHAMBER): 24.1 CM2
LEFT ATRIUM SIZE: 3.6 CM
LEFT INTERNAL DIMENSION IN SYSTOLE: 2.8 CM (ref 2.1–4)
LEFT VENTRICLE DIASTOLIC VOLUME INDEX: 62 ML/M2
LEFT VENTRICLE DIASTOLIC VOLUME: 124 ML
LEFT VENTRICLE END DIASTOLIC VOLUME APICAL 2 CHAMBER: 124 ML
LEFT VENTRICLE END DIASTOLIC VOLUME APICAL 4 CHAMBER: 117 ML
LEFT VENTRICLE END SYSTOLIC VOLUME APICAL 2 CHAMBER: 91.5 ML
LEFT VENTRICLE END SYSTOLIC VOLUME APICAL 4 CHAMBER: 85.6 ML
LEFT VENTRICLE MASS INDEX: 174.5 G/M2
LEFT VENTRICLE SYSTOLIC VOLUME INDEX: 15 ML/M2
LEFT VENTRICLE SYSTOLIC VOLUME: 30 ML
LEFT VENTRICULAR INTERNAL DIMENSION IN DIASTOLE: 5.1 CM (ref 3.5–6)
LEFT VENTRICULAR MASS: 349 G
LV LATERAL E/E' RATIO: 18.9 M/S
LV SEPTAL E/E' RATIO: 18.9 M/S
LVED V (TEICH): 124 ML
LVES V (TEICH): 29.6 ML
LVOT MG: 6 MMHG
LVOT MV: 1.11 CM/S
LYMPHOCYTES # BLD AUTO: 1.33 X10(3)/MCL (ref 0.6–4.6)
LYMPHOCYTES NFR BLD AUTO: 17.6 %
Lab: 1.8 CM/M
MCH RBC QN AUTO: 31.2 PG (ref 27–31)
MCHC RBC AUTO-ENTMCNC: 33.7 G/DL (ref 33–36)
MCV RBC AUTO: 92.5 FL (ref 80–94)
MONOCYTES # BLD AUTO: 0.64 X10(3)/MCL (ref 0.1–1.3)
MONOCYTES NFR BLD AUTO: 8.5 %
MV MEAN GRADIENT: 7 MMHG
MV PEAK A VEL: 1.49 M/S
MV PEAK E VEL: 1.32 M/S
MV PEAK GRADIENT: 10 MMHG
MV VALVE AREA BY CONTINUITY EQUATION: 2.85 CM2
NEUTROPHILS # BLD AUTO: 5.42 X10(3)/MCL (ref 2.1–9.2)
NEUTROPHILS NFR BLD AUTO: 71.7 %
NRBC BLD AUTO-RTO: 0.3 %
OHS CV CPX PATIENT HEIGHT IN: 64
OHS CV RV/LV RATIO: 0.65 CM
OHS LV EJECTION FRACTION SIMPSONS BIPLANE MOD: 61 %
PISA TR MAX VEL: 2.7 M/S
PLATELET # BLD AUTO: 194 X10(3)/MCL (ref 130–400)
PMV BLD AUTO: 10.1 FL (ref 7.4–10.4)
POTASSIUM SERPL-SCNC: 3.4 MMOL/L (ref 3.5–5.1)
PROT SERPL-MCNC: 7.3 GM/DL (ref 6.4–8.3)
RBC # BLD AUTO: 2.66 X10(6)/MCL (ref 4.2–5.4)
RIGHT VENTRICLE DIASTOLIC BASEL DIMENSION: 3.3 CM
RIGHT VENTRICLE DIASTOLIC LENGTH: 7.7 CM
RIGHT VENTRICLE DIASTOLIC MID DIMENSION: 2.7 CM
RIGHT VENTRICULAR LENGTH IN DIASTOLE (APICAL 4-CHAMBER VIEW): 7.7 CM
RV MID DIAMA: 2.7 CM
RV TISSUE DOPPLER FREE WALL SYSTOLIC VELOCITY 1 (APICAL 4 CHAMBER VIEW): 16.2 CM/S
SODIUM SERPL-SCNC: 140 MMOL/L (ref 136–145)
STJ: 2.9 CM
TDI LATERAL: 0.07 M/S
TDI SEPTAL: 0.07 M/S
TDI: 0.07 M/S
TIBC SERPL-MCNC: 121 UG/DL (ref 70–310)
TIBC SERPL-MCNC: 172 UG/DL (ref 250–450)
TR MAX PG: 30 MMHG
TRANSFERRIN SERPL-MCNC: 145 MG/DL (ref 180–382)
TV MEAN GRADIENT: 5 MMHG
TV PEAK GRADIENT: 10 MMHG
WBC # BLD AUTO: 7.55 X10(3)/MCL (ref 4.5–11.5)
Z-SCORE OF LEFT VENTRICULAR DIMENSION IN END DIASTOLE: -1.34
Z-SCORE OF LEFT VENTRICULAR DIMENSION IN END SYSTOLE: -1.94

## 2025-07-26 PROCEDURE — 25000003 PHARM REV CODE 250

## 2025-07-26 PROCEDURE — 85025 COMPLETE CBC W/AUTO DIFF WBC: CPT | Performed by: NURSE PRACTITIONER

## 2025-07-26 PROCEDURE — 21400001 HC TELEMETRY ROOM

## 2025-07-26 PROCEDURE — 80053 COMPREHEN METABOLIC PANEL: CPT | Performed by: NURSE PRACTITIONER

## 2025-07-26 PROCEDURE — 82728 ASSAY OF FERRITIN: CPT | Performed by: NURSE PRACTITIONER

## 2025-07-26 PROCEDURE — 25000003 PHARM REV CODE 250: Performed by: NURSE PRACTITIONER

## 2025-07-26 PROCEDURE — 83540 ASSAY OF IRON: CPT | Performed by: NURSE PRACTITIONER

## 2025-07-26 PROCEDURE — 99900031 HC PATIENT EDUCATION (STAT)

## 2025-07-26 PROCEDURE — 94761 N-INVAS EAR/PLS OXIMETRY MLT: CPT

## 2025-07-26 PROCEDURE — 25000242 PHARM REV CODE 250 ALT 637 W/ HCPCS: Performed by: INTERNAL MEDICINE

## 2025-07-26 PROCEDURE — 94640 AIRWAY INHALATION TREATMENT: CPT

## 2025-07-26 PROCEDURE — 25000003 PHARM REV CODE 250: Performed by: INTERNAL MEDICINE

## 2025-07-26 PROCEDURE — 36415 COLL VENOUS BLD VENIPUNCTURE: CPT | Performed by: NURSE PRACTITIONER

## 2025-07-26 PROCEDURE — 27000221 HC OXYGEN, UP TO 24 HOURS

## 2025-07-26 PROCEDURE — 99900035 HC TECH TIME PER 15 MIN (STAT)

## 2025-07-26 PROCEDURE — 94760 N-INVAS EAR/PLS OXIMETRY 1: CPT

## 2025-07-26 RX ORDER — HYDRALAZINE HYDROCHLORIDE 50 MG/1
50 TABLET, FILM COATED ORAL 2 TIMES DAILY
Status: DISCONTINUED | OUTPATIENT
Start: 2025-07-26 | End: 2025-07-26

## 2025-07-26 RX ORDER — HYDRALAZINE HYDROCHLORIDE 50 MG/1
50 TABLET, FILM COATED ORAL EVERY 8 HOURS
Status: DISCONTINUED | OUTPATIENT
Start: 2025-07-26 | End: 2025-07-26

## 2025-07-26 RX ORDER — TALC
6 POWDER (GRAM) TOPICAL NIGHTLY PRN
Status: DISCONTINUED | OUTPATIENT
Start: 2025-07-26 | End: 2025-07-28 | Stop reason: HOSPADM

## 2025-07-26 RX ORDER — IPRATROPIUM BROMIDE AND ALBUTEROL SULFATE 2.5; .5 MG/3ML; MG/3ML
3 SOLUTION RESPIRATORY (INHALATION) EVERY 6 HOURS PRN
Status: DISCONTINUED | OUTPATIENT
Start: 2025-07-26 | End: 2025-07-28 | Stop reason: HOSPADM

## 2025-07-26 RX ORDER — PREDNISONE 20 MG/1
40 TABLET ORAL DAILY
Status: DISCONTINUED | OUTPATIENT
Start: 2025-07-27 | End: 2025-07-28

## 2025-07-26 RX ORDER — CLONAZEPAM 1 MG/1
1 TABLET ORAL ONCE
Status: COMPLETED | OUTPATIENT
Start: 2025-07-26 | End: 2025-07-26

## 2025-07-26 RX ADMIN — IPRATROPIUM BROMIDE AND ALBUTEROL SULFATE 3 ML: .5; 3 SOLUTION RESPIRATORY (INHALATION) at 01:07

## 2025-07-26 RX ADMIN — IPRATROPIUM BROMIDE AND ALBUTEROL SULFATE 3 ML: .5; 3 SOLUTION RESPIRATORY (INHALATION) at 07:07

## 2025-07-26 RX ADMIN — HYDRALAZINE HYDROCHLORIDE 50 MG: 25 TABLET ORAL at 10:07

## 2025-07-26 RX ADMIN — SEVELAMER CARBONATE 800 MG: 800 TABLET, FILM COATED ORAL at 04:07

## 2025-07-26 RX ADMIN — CLONAZEPAM 1 MG: 1 TABLET ORAL at 02:07

## 2025-07-26 RX ADMIN — SEVELAMER CARBONATE 800 MG: 800 TABLET, FILM COATED ORAL at 12:07

## 2025-07-26 RX ADMIN — CLONIDINE HYDROCHLORIDE 0.3 MG: 0.2 TABLET ORAL at 10:07

## 2025-07-26 RX ADMIN — Medication 6 MG: at 10:07

## 2025-07-26 RX ADMIN — MUPIROCIN: 20 OINTMENT TOPICAL at 10:07

## 2025-07-26 RX ADMIN — ATORVASTATIN CALCIUM 40 MG: 40 TABLET, FILM COATED ORAL at 10:07

## 2025-07-26 RX ADMIN — LINACLOTIDE 145 MCG: 145 CAPSULE, GELATIN COATED ORAL at 10:07

## 2025-07-26 RX ADMIN — HYDRALAZINE HYDROCHLORIDE 75 MG: 50 TABLET ORAL at 10:07

## 2025-07-26 RX ADMIN — BUSPIRONE HYDROCHLORIDE 10 MG: 5 TABLET ORAL at 10:07

## 2025-07-26 RX ADMIN — HYDRALAZINE HYDROCHLORIDE 75 MG: 50 TABLET ORAL at 04:07

## 2025-07-26 RX ADMIN — PANTOPRAZOLE SODIUM 40 MG: 40 TABLET, DELAYED RELEASE ORAL at 10:07

## 2025-07-26 RX ADMIN — BUSPIRONE HYDROCHLORIDE 10 MG: 5 TABLET ORAL at 02:07

## 2025-07-26 RX ADMIN — AMLODIPINE BESYLATE 10 MG: 5 TABLET ORAL at 10:07

## 2025-07-26 RX ADMIN — CLONIDINE HYDROCHLORIDE 0.3 MG: 0.2 TABLET ORAL at 02:07

## 2025-07-26 RX ADMIN — SEVELAMER CARBONATE 800 MG: 800 TABLET, FILM COATED ORAL at 10:07

## 2025-07-26 RX ADMIN — LEVOTHYROXINE SODIUM 50 MCG: 0.05 TABLET ORAL at 10:07

## 2025-07-26 NOTE — PROGRESS NOTES
Nephrology Progress Note  [facility]      Patient Name: Ted Edwards  Age: 44 y.o.  : 1980  MRN: [id number]  Admission Date: 2025      HPI:    Ted Edwards is a 44 y.o. female with a history of ESRD on HD, hypertension, anemia secondary to chronic kidney disease, hyperlipidemia, hypothyroidism, and substance abuse (discontinued use in ).  She presented to the ED yesterday with shortness of breath.  Patient is on hemodialysis and usually has hemodialysis 4 times a week and is followed by Dr. Peraza, patient reportedly has missed the last 8 treatments secondary to feeling ill with symptoms of bronchitis including productive cough.  In addition patient reported orthopnea, dyspnea on exertion, and peripheral edema.  Patient is current everyday smoker.  Chest x-ray revealed pulmonary edema with cardiomegaly.     Patient is lying in bed awake.  She is on nasal cannula at 2 L and reports comfort with breathing.  She does not wear home oxygen.  Patient reports that she continues to have a cough, it is nonproductive.  She reports history of allergic rhinitis and appears to have a postnasal drip.  She has not been taking any medications to treat symptoms but states the cough has been ongoing for several weeks.  She has no other physical complaints currently.  Phosphorus is elevated, patient is on Auryxia at home, she states that she does take as prescribed with meals.  She reports appetite has been good.  She reports having regular bowel movements.  She is anuric.    Interval History:  2025  Sitting up in bed currently undergoing breathing treatment.  NAD.  Underwent HD yesterday states that breathing is much better today and feels like she has sign.      ROS:    Review of Systems   Constitutional: Negative.    HENT: Negative.     Eyes: Negative.    Respiratory: Negative.     Cardiovascular: Negative.    Gastrointestinal: Negative.    Genitourinary: Negative.   "  Musculoskeletal: Negative.    Skin: Negative.    Neurological: Negative.    Endo/Heme/Allergies: Negative.    Psychiatric/Behavioral: Negative.         Vital Signs:  BP (!) 152/81   Pulse 79   Temp 97.5 °F (36.4 °C) (Oral)   Resp 18   Ht 5' 4" (1.626 m)   Wt 95.6 kg (210 lb 12.8 oz)   LMP 09/20/2018   SpO2 (!) 91%   Breastfeeding No   BMI 36.18 kg/m²   Body mass index is 36.18 kg/m².    GEN: Chronically ill appearing in NAD  HEENT: Conjunctiva anicteric, pupils equal, MMM, OP benign  CV: RRR +S1,S2 without murmur  PULM: CTAB, unlabored  ABD: Soft, NT/ND abdomen with NABS  EXT: No cyanosis or edema  SKIN: Warm and dry  PSYCH: Awake, alert, and appropriately conversant  Vascular access:  Dialysis Access:  Right arm AV fistula    Labs:  Recent Results (from the past 24 hours)   Troponin I High Sensitivity    Collection Time: 07/25/25  5:16 PM   Result Value Ref Range    Troponin High Sensitive 37 (H) <=14 ng/L   Comprehensive Metabolic Panel    Collection Time: 07/26/25  1:56 AM   Result Value Ref Range    Sodium 140 136 - 145 mmol/L    Potassium 3.4 (L) 3.5 - 5.1 mmol/L    Chloride 98 98 - 107 mmol/L    CO2 26 22 - 29 mmol/L    Glucose 122 (H) 74 - 100 mg/dL    Blood Urea Nitrogen 51.5 (H) 7.0 - 18.7 mg/dL    Creatinine 18.36 (H) 0.55 - 1.02 mg/dL    Calcium 9.6 8.4 - 10.2 mg/dL    Protein Total 7.3 6.4 - 8.3 gm/dL    Albumin 3.8 3.5 - 5.0 g/dL    Globulin 3.5 2.4 - 3.5 gm/dL    Albumin/Globulin Ratio 1.1 1.1 - 2.0 ratio    Bilirubin Total 0.7 <=1.5 mg/dL    ALP 87 40 - 150 unit/L    ALT 15 0 - 55 unit/L    AST 15 11 - 45 unit/L    eGFR 2 mL/min/1.73/m2    Anion Gap 16.0 mEq/L    BUN/Creatinine Ratio 3    Ferritin    Collection Time: 07/26/25  1:56 AM   Result Value Ref Range    Ferritin Level 2,544.16 (H) 4.63 - 204.00 ng/mL   Iron and TIBC    Collection Time: 07/26/25  1:56 AM   Result Value Ref Range    Iron Binding Capacity Unsaturated 121 70 - 310 ug/dL    Iron Level 51 50 - 170 ug/dL    Transferrin " 145 (L) 180 - 382 mg/dL    Iron Binding Capacity Total 172 (L) 250 - 450 ug/dL    Iron Saturation 30 20 - 50 %   CBC with Differential    Collection Time: 07/26/25  1:56 AM   Result Value Ref Range    WBC 7.55 4.50 - 11.50 x10(3)/mcL    RBC 2.66 (L) 4.20 - 5.40 x10(6)/mcL    Hgb 8.3 (L) 12.0 - 16.0 g/dL    Hct 24.6 (L) 37.0 - 47.0 %    MCV 92.5 80.0 - 94.0 fL    MCH 31.2 (H) 27.0 - 31.0 pg    MCHC 33.7 33.0 - 36.0 g/dL    RDW 15.3 11.5 - 17.0 %    Platelet 194 130 - 400 x10(3)/mcL    MPV 10.1 7.4 - 10.4 fL    Neut % 71.7 %    Lymph % 17.6 %    Mono % 8.5 %    Eos % 1.1 %    Basophil % 0.4 %    Imm Grans % 0.7 %    Neut # 5.42 2.1 - 9.2 x10(3)/mcL    Lymph # 1.33 0.6 - 4.6 x10(3)/mcL    Mono # 0.64 0.1 - 1.3 x10(3)/mcL    Eos # 0.08 0 - 0.9 x10(3)/mcL    Baso # 0.03 <=0.2 x10(3)/mcL    Imm Gran # 0.05 (H) 0.00 - 0.04 x10(3)/mcL    NRBC% 0.3 %         Assessment:    ESRD on HD with fluid volume overload- continue MWF HD schedule  Pulmonary edema  Hypertension  Anemia secondary to CKD slight improvement today, continued on  Tobacco use  Hypokalemia-continue to monitor       Recommendations:   No acute indication for hemodialysis today  Increase hydralazine for blood pressure control

## 2025-07-26 NOTE — PROGRESS NOTES
[facility] Hospital Medicine Progress Note        Chief Complaint: Inpatient Follow-up     HPI:      Patient is a 44-year-old  female with a history of hypertension, hyperlipidemia, obesity, thyroid disease and end-stage renal disease with dialysis at home 4 times a week.  Patient refers she has missed 8 dialysis treatments secondary to not feeling well, bronchitis.  Decided to come to the emergency room for evaluation with findings of volume overload.  Chest x-ray with pulmonary edema/cardiomegaly.  Patient complains of cough/orthopnea/dyspnea on exertion/PND/peripheral edema.  Current smoker.  Patient is sitting at edge of bed     Primary nephrologist- Karmen ROY  1rjane ROY- Dr COTO( transitioning to a new MD)    Interval Hx:   Nephrology following.  Underwent dialysis yesterday.    Patient was seen and examined, shortness for breath improved after dialysis, continues to have cough, stated she had chest discomfort last night, chart was reviewed, T-max of 99.2°.  Hemoglobin 8.3 today    Objective/physical exam:  General: In no acute distress, afebrile  Chest: Clear to auscultation bilaterally  Heart:  +S1, S2, no appreciable murmur  Abdomen: Soft, nontender, BS +  MSK: Warm, + lower extremity edema, no clubbing or cyanosis  Neurologic: Alert and oriented x4, Cranial nerve II-XII intact, Strength 5/5 in all 4 extremities    VITAL SIGNS: 24 HRS MIN & MAX LAST   Temp  Min: 97.5 °F (36.4 °C)  Max: 99.2 °F (37.3 °C) 98.5 °F (36.9 °C)   BP  Min: 141/78  Max: 167/94 (!) 141/78   Pulse  Min: 77  Max: 88  84   Resp  Min: 18  Max: 19 19   SpO2  Min: 91 %  Max: 99 % 95 %     I have reviewed the following labs:  Recent Labs   Lab 07/24/25  1411 07/25/25  0239 07/26/25  0156   WBC 7.54 7.05 7.55   RBC 2.46* 2.44* 2.66*   HGB 7.7* 7.6* 8.3*   HCT 23.7* 23.1* 24.6*   MCV 96.3* 94.7* 92.5   MCH 31.3* 31.1* 31.2*   MCHC 32.5* 32.9* 33.7   RDW 15.4 14.9 15.3    176 194   MPV 9.8 10.2  10.1     Recent Labs   Lab 07/24/25  1429 07/25/25  0239 07/26/25  0156    143 140   K 4.1 4.2 3.4*    103 98   CO2 20* 20* 26   BUN 90.1* 94.8* 51.5*   CREATININE 28.50* 29.80* 18.36*   * 104* 122*   CALCIUM 9.9 9.5 9.6   MG  --  3.00*  --    ALBUMIN 4.1 3.7 3.8   PROT 7.9 6.9 7.3   ALKPHOS 77 68 87   ALT 17 12 15   AST 15 13 15   BILITOT 0.8 0.7 0.7     Microbiology Results (last 7 days)       ** No results found for the last 168 hours. **             See below for Radiology    Assessment/Plan:  Pulmonary edema  Volume overload   ESRD on hemodialysis-missed dialysis  NSTEMI, unspecified  Anemia, unspecified, possibly due to CKD  Hypertension  Tobacco use    History of hyperlipidemia/thyroid use     Plan:    Nephrology was consulted for dialysis.  Needs fluid removal.  Monitor BMP  Reported chest discomfort this morning, echo obtained, continue telemetry monitoring, troponin elevated, we will obtain nonurgent cardiology evaluation  Keep hemoglobin above 7.  Follow up on iron studies.  Monitor for any signs of GI loss. Retacrit with dialysis .   Continue to monitor blood pressure, home medications resumed-on amlodipine, clonidine, hydralazine, up titrate regimen as needed-going up on hydralazine.  Also on p.r.n. antihypertensives if needed.  Continue supportive care, Bronchodilators p.r.n. Antitussives p.r.n..  Prednisone    VTE prophylaxis:  Lovenox      Anticipated discharge and Disposition:   To be decided      All diagnosis and differential diagnosis have been reviewed; assessment and plan has been documented; I have personally reviewed the labs and test results that are presently available; I have reviewed the patients medication list; I have reviewed the consulting providers response and recommendations. I have reviewed or attempted to review medical records based upon their availability    All of the patient's questions have been  addressed and answered. Patient's is agreeable to the above  stated plan. I will continue to monitor closely and make adjustments to medical management as needed.    Portions of this note dictated using EMR integrated voice recognition software, and may be subject to voice recognition errors not corrected at proofreading. Please contact writer for clarification if needed.   _____________________________________________________________________    Malnutrition Status:  Nutrition consulted. Most recent weight and BMI monitored-     Measurements:  Wt Readings from Last 1 Encounters:   07/25/25 95.6 kg (210 lb 12.8 oz)   Body mass index is 36.18 kg/m².    Patient has been screened and assessed by RD.    Malnutrition Type:  Context:    Level: other (see comments) (Does not meet criteria)    Malnutrition Characteristic Summary:       Interventions/Recommendations (treatment strategy):        Scheduled Med:   amLODIPine  10 mg Oral Daily    atorvastatin  40 mg Oral Daily    busPIRone  10 mg Oral TID    cloNIDine  0.3 mg Oral TID    epoetin zoraida-ebpx (RETACRIT) injection  20,000 Units Subcutaneous Every Mon, Wed, Fri    hydrALAZINE  50 mg Oral BID    levothyroxine  50 mcg Oral Daily    linaCLOtide  145 mcg Oral Daily    mupirocin   Nasal BID    pantoprazole  40 mg Oral Daily    sevelamer carbonate  800 mg Oral TID WM      Continuous Infusions:     PRN Meds:    Current Facility-Administered Medications:     acetaminophen, 1,000 mg, Oral, Q6H PRN    albuterol, 1 puff, Inhalation, Q6H PRN    albuterol-ipratropium, 3 mL, Nebulization, Q6H PRN    dextromethorphan-guaiFENesin  mg/5 ml, 5 mL, Oral, Q4H PRN    docusate sodium, 100 mg, Oral, BID PRN    hydrALAZINE, 10 mg, Intravenous, Q6H PRN    labetaloL, 10 mg, Intravenous, Q6H PRN    ondansetron, 8 mg, Oral, Q8H PRN     Radiology:  I have personally reviewed the following imaging and agree with the radiologist.     Echo    Left Ventricle: The left ventricle is normal in size. Mildly increased   wall thickness. There is mild concentric  hypertrophy. There is normal   systolic function with a visually estimated ejection fraction of 60 - 65%.   Grade II diastolic dysfunction.    Right Ventricle: The right ventricle is normal in size measuring 3.3   cm. Systolic function is normal.    Left Atrium: The left atrium is moderately dilated. Agitated saline   study of the atrial septum is negative after vasalva maneuver, suggesting   absence of intracardiac shunt at the atrial level.    Aortic Valve: The aortic valve is a trileaflet valve. There is mild   aortic valve sclerosis.    Mitral Valve: Mildly calcified posterior leaflet. There is mild   stenosis. The mean pressure gradient across the mitral valve is 7 mmHg at   a heart rate of  bpm.    Tricuspid Valve: Mildly thickened leaflets.    Pericardium: There is a small effusion.      Savita Wheeler MD  Department of Hospital Medicine   [facility]   07/26/2025

## 2025-07-27 LAB
ALBUMIN SERPL-MCNC: 3.6 G/DL (ref 3.5–5)
ALBUMIN/GLOB SERPL: 1.1 RATIO (ref 1.1–2)
ALP SERPL-CCNC: 72 UNIT/L (ref 40–150)
ALT SERPL-CCNC: 17 UNIT/L (ref 0–55)
ANION GAP SERPL CALC-SCNC: 16 MEQ/L
AST SERPL-CCNC: 15 UNIT/L (ref 11–45)
B PERT.PT PRMT NPH QL NAA+NON-PROBE: NOT DETECTED
BASOPHILS # BLD AUTO: 0.03 X10(3)/MCL
BASOPHILS NFR BLD AUTO: 0.4 %
BILIRUB SERPL-MCNC: 0.6 MG/DL
BUN SERPL-MCNC: 64.4 MG/DL (ref 7–18.7)
C PNEUM DNA NPH QL NAA+NON-PROBE: NOT DETECTED
CALCIUM SERPL-MCNC: 9.8 MG/DL (ref 8.4–10.2)
CHLORIDE SERPL-SCNC: 97 MMOL/L (ref 98–107)
CO2 SERPL-SCNC: 25 MMOL/L (ref 22–29)
CREAT SERPL-MCNC: 20.75 MG/DL (ref 0.55–1.02)
CREAT/UREA NIT SERPL: 3
EOSINOPHIL # BLD AUTO: 0.1 X10(3)/MCL (ref 0–0.9)
EOSINOPHIL NFR BLD AUTO: 1.5 %
ERYTHROCYTE [DISTWIDTH] IN BLOOD BY AUTOMATED COUNT: 15.5 % (ref 11.5–17)
FLUAV AG UPPER RESP QL IA.RAPID: NOT DETECTED
FLUBV AG UPPER RESP QL IA.RAPID: NOT DETECTED
GFR SERPLBLD CREATININE-BSD FMLA CKD-EPI: 2 ML/MIN/1.73/M2
GLOBULIN SER-MCNC: 3.2 GM/DL (ref 2.4–3.5)
GLUCOSE SERPL-MCNC: 109 MG/DL (ref 74–100)
HADV DNA NPH QL NAA+NON-PROBE: NOT DETECTED
HCOV 229E RNA NPH QL NAA+NON-PROBE: NOT DETECTED
HCOV HKU1 RNA NPH QL NAA+NON-PROBE: NOT DETECTED
HCOV NL63 RNA NPH QL NAA+NON-PROBE: NOT DETECTED
HCOV OC43 RNA NPH QL NAA+NON-PROBE: NOT DETECTED
HCT VFR BLD AUTO: 23.3 % (ref 37–47)
HGB BLD-MCNC: 7.8 G/DL (ref 12–16)
HMPV RNA NPH QL NAA+NON-PROBE: NOT DETECTED
HPIV1 RNA NPH QL NAA+NON-PROBE: NOT DETECTED
HPIV2 RNA NPH QL NAA+NON-PROBE: NOT DETECTED
HPIV3 RNA NPH QL NAA+NON-PROBE: NOT DETECTED
HPIV4 RNA NPH QL NAA+NON-PROBE: NOT DETECTED
IMM GRANULOCYTES # BLD AUTO: 0.04 X10(3)/MCL (ref 0–0.04)
IMM GRANULOCYTES NFR BLD AUTO: 0.6 %
LYMPHOCYTES # BLD AUTO: 1.23 X10(3)/MCL (ref 0.6–4.6)
LYMPHOCYTES NFR BLD AUTO: 18.3 %
M PNEUMO DNA NPH QL NAA+NON-PROBE: NOT DETECTED
MCH RBC QN AUTO: 31.2 PG (ref 27–31)
MCHC RBC AUTO-ENTMCNC: 33.5 G/DL (ref 33–36)
MCV RBC AUTO: 93.2 FL (ref 80–94)
MONOCYTES # BLD AUTO: 0.57 X10(3)/MCL (ref 0.1–1.3)
MONOCYTES NFR BLD AUTO: 8.5 %
NEUTROPHILS # BLD AUTO: 4.75 X10(3)/MCL (ref 2.1–9.2)
NEUTROPHILS NFR BLD AUTO: 70.7 %
NRBC BLD AUTO-RTO: 0.3 %
PLATELET # BLD AUTO: 189 X10(3)/MCL (ref 130–400)
PMV BLD AUTO: 10.1 FL (ref 7.4–10.4)
POTASSIUM SERPL-SCNC: 4 MMOL/L (ref 3.5–5.1)
PROT SERPL-MCNC: 6.8 GM/DL (ref 6.4–8.3)
RBC # BLD AUTO: 2.5 X10(6)/MCL (ref 4.2–5.4)
RSV A 5' UTR RNA NPH QL NAA+PROBE: NOT DETECTED
RSV RNA NPH QL NAA+NON-PROBE: NOT DETECTED
RV+EV RNA NPH QL NAA+NON-PROBE: NOT DETECTED
SARS-COV-2 RNA RESP QL NAA+PROBE: NOT DETECTED
SODIUM SERPL-SCNC: 138 MMOL/L (ref 136–145)
WBC # BLD AUTO: 6.72 X10(3)/MCL (ref 4.5–11.5)

## 2025-07-27 PROCEDURE — 85025 COMPLETE CBC W/AUTO DIFF WBC: CPT | Performed by: NURSE PRACTITIONER

## 2025-07-27 PROCEDURE — 25000003 PHARM REV CODE 250

## 2025-07-27 PROCEDURE — 87798 DETECT AGENT NOS DNA AMP: CPT | Performed by: INTERNAL MEDICINE

## 2025-07-27 PROCEDURE — 25000003 PHARM REV CODE 250: Performed by: INTERNAL MEDICINE

## 2025-07-27 PROCEDURE — 21400001 HC TELEMETRY ROOM

## 2025-07-27 PROCEDURE — 36415 COLL VENOUS BLD VENIPUNCTURE: CPT | Performed by: NURSE PRACTITIONER

## 2025-07-27 PROCEDURE — 87637 SARSCOV2&INF A&B&RSV AMP PRB: CPT | Performed by: INTERNAL MEDICINE

## 2025-07-27 PROCEDURE — 63600175 PHARM REV CODE 636 W HCPCS: Performed by: INTERNAL MEDICINE

## 2025-07-27 PROCEDURE — 80053 COMPREHEN METABOLIC PANEL: CPT | Performed by: NURSE PRACTITIONER

## 2025-07-27 RX ORDER — CLONAZEPAM 1 MG/1
1 TABLET ORAL NIGHTLY
Status: DISCONTINUED | OUTPATIENT
Start: 2025-07-27 | End: 2025-07-28 | Stop reason: HOSPADM

## 2025-07-27 RX ORDER — CLONAZEPAM 1 MG/1
1 TABLET ORAL ONCE
Status: COMPLETED | OUTPATIENT
Start: 2025-07-27 | End: 2025-07-27

## 2025-07-27 RX ADMIN — CLONAZEPAM 1 MG: 1 TABLET ORAL at 08:07

## 2025-07-27 RX ADMIN — CLONAZEPAM 1 MG: 1 TABLET ORAL at 12:07

## 2025-07-27 RX ADMIN — HYDRALAZINE HYDROCHLORIDE 75 MG: 50 TABLET ORAL at 10:07

## 2025-07-27 RX ADMIN — CLONIDINE HYDROCHLORIDE 0.3 MG: 0.2 TABLET ORAL at 03:07

## 2025-07-27 RX ADMIN — ATORVASTATIN CALCIUM 40 MG: 40 TABLET, FILM COATED ORAL at 09:07

## 2025-07-27 RX ADMIN — MUPIROCIN: 20 OINTMENT TOPICAL at 08:07

## 2025-07-27 RX ADMIN — HYDRALAZINE HYDROCHLORIDE 75 MG: 50 TABLET ORAL at 03:07

## 2025-07-27 RX ADMIN — LEVOTHYROXINE SODIUM 50 MCG: 0.05 TABLET ORAL at 09:07

## 2025-07-27 RX ADMIN — SEVELAMER CARBONATE 800 MG: 800 TABLET, FILM COATED ORAL at 04:07

## 2025-07-27 RX ADMIN — CLONIDINE HYDROCHLORIDE 0.3 MG: 0.2 TABLET ORAL at 09:07

## 2025-07-27 RX ADMIN — PANTOPRAZOLE SODIUM 40 MG: 40 TABLET, DELAYED RELEASE ORAL at 09:07

## 2025-07-27 RX ADMIN — BUSPIRONE HYDROCHLORIDE 10 MG: 5 TABLET ORAL at 09:07

## 2025-07-27 RX ADMIN — SEVELAMER CARBONATE 800 MG: 800 TABLET, FILM COATED ORAL at 09:07

## 2025-07-27 RX ADMIN — CLONIDINE HYDROCHLORIDE 0.3 MG: 0.2 TABLET ORAL at 10:07

## 2025-07-27 RX ADMIN — AMLODIPINE BESYLATE 10 MG: 5 TABLET ORAL at 09:07

## 2025-07-27 RX ADMIN — PREDNISONE 40 MG: 20 TABLET ORAL at 09:07

## 2025-07-27 RX ADMIN — HYDRALAZINE HYDROCHLORIDE 75 MG: 50 TABLET ORAL at 05:07

## 2025-07-27 RX ADMIN — MUPIROCIN: 20 OINTMENT TOPICAL at 09:07

## 2025-07-27 NOTE — PROGRESS NOTES
[facility] Hospital Medicine Progress Note        Chief Complaint: Inpatient Follow-up     HPI:      Patient is a 44-year-old  female with a history of hypertension, hyperlipidemia, obesity, thyroid disease and end-stage renal disease with dialysis at home 4 times a week.  Patient refers she has missed 8 dialysis treatments secondary to not feeling well, bronchitis.  Decided to come to the emergency room for evaluation with findings of volume overload.  Chest x-ray with pulmonary edema/cardiomegaly.  Patient complains of cough/orthopnea/dyspnea on exertion/PND/peripheral edema.  Current smoker.  Patient is sitting at edge of bed     Primary nephrologist- Karmen norman MD- Dr COTO( transitioning to a new MD)    Nephrology was consulted  Underwent dialysis .  Refused any blood transfusion when offered.  Denied any signs of GI blood loss.  Cardiology was consulted    Interval Hx:   No overnight events.  Repeat imaging was ordered.     Patient was seen and examined, denies any chest pain, stated improvement in shortness a breath,  chart was reviewed, T-max of 98.4°.  Hemoglobin 7.8    Objective/physical exam:  General: In no acute distress, afebrile  Chest: Clear to auscultation bilaterally  Heart:  +S1, S2, no appreciable murmur  Abdomen: Soft, nontender, BS +  MSK: Warm, + lower extremity edema, no clubbing or cyanosis  Neurologic: Alert and oriented x4, Cranial nerve II-XII intact, Strength 5/5 in all 4 extremities    VITAL SIGNS: 24 HRS MIN & MAX LAST   Temp  Min: 97.6 °F (36.4 °C)  Max: 98.4 °F (36.9 °C) 97.6 °F (36.4 °C)   BP  Min: 141/78  Max: 156/89 (!) 156/89   Pulse  Min: 80  Max: 144  (!) 144   Resp  Min: 18  Max: 20 20   SpO2  Min: 90 %  Max: 100 % (!) 90 %     I have reviewed the following labs:  Recent Labs   Lab 07/25/25  0239 07/26/25  0156 07/27/25  0503   WBC 7.05 7.55 6.72   RBC 2.44* 2.66* 2.50*   HGB 7.6* 8.3* 7.8*   HCT 23.1* 24.6* 23.3*   MCV 94.7*  92.5 93.2   MCH 31.1* 31.2* 31.2*   MCHC 32.9* 33.7 33.5   RDW 14.9 15.3 15.5    194 189   MPV 10.2 10.1 10.1     Recent Labs   Lab 07/25/25  0239 07/26/25  0156 07/27/25  0503    140 138   K 4.2 3.4* 4.0    98 97*   CO2 20* 26 25   BUN 94.8* 51.5* 64.4*   CREATININE 29.80* 18.36* 20.75*   * 122* 109*   CALCIUM 9.5 9.6 9.8   MG 3.00*  --   --    ALBUMIN 3.7 3.8 3.6   PROT 6.9 7.3 6.8   ALKPHOS 68 87 72   ALT 12 15 17   AST 13 15 15   BILITOT 0.7 0.7 0.6     Microbiology Results (last 7 days)       ** No results found for the last 168 hours. **             See below for Radiology    Assessment/Plan:  Pulmonary edema  Volume overload   ESRD on hemodialysis-missed dialysis  NSTEMI, unspecified  Anemia, unspecified, possibly due to CKD  Hypertension  Tobacco use    History of hyperlipidemia/thyroid use     Plan:    Dialysis per Nephrology.  Monitor BMP  Cardiology on board for NSTEMI and chest discomfort she stated.  Appreciate recommendations.  Monitor on telemetry.  Bronchodilators p.r.n. Antitussives p.r.n..Prednisone.Follow up respiratory panel  Keep hemoglobin above 7.  Retacrit with dialysis .   Continue to monitor blood pressure, home medications resumed-on amlodipine, clonidine, hydralazine, up titrate regimen as needed-increased hydralazine.  Also on p.r.n. antihypertensives if needed.  Continue supportive care and appropriate home medications,     VTE prophylaxis:  Lovenox      Anticipated discharge and Disposition:   To be decided      All diagnosis and differential diagnosis have been reviewed; assessment and plan has been documented; I have personally reviewed the labs and test results that are presently available; I have reviewed the patients medication list; I have reviewed the consulting providers response and recommendations. I have reviewed or attempted to review medical records based upon their availability    All of the patient's questions have been  addressed and answered.  Patient's is agreeable to the above stated plan. I will continue to monitor closely and make adjustments to medical management as needed.    Portions of this note dictated using EMR integrated voice recognition software, and may be subject to voice recognition errors not corrected at proofreading. Please contact writer for clarification if needed.   _____________________________________________________________________    Malnutrition Status:  Nutrition consulted. Most recent weight and BMI monitored-     Measurements:  Wt Readings from Last 1 Encounters:   07/27/25 95.7 kg (210 lb 15.7 oz)   Body mass index is 36.21 kg/m².    Patient has been screened and assessed by RD.    Malnutrition Type:  Context:    Level: other (see comments) (Does not meet criteria)    Malnutrition Characteristic Summary:       Interventions/Recommendations (treatment strategy):        Scheduled Med:   amLODIPine  10 mg Oral Daily    atorvastatin  40 mg Oral Daily    busPIRone  10 mg Oral TID    cloNIDine  0.3 mg Oral TID    epoetin zoraida-ebpx (RETACRIT) injection  20,000 Units Subcutaneous Every Mon, Wed, Fri    hydrALAZINE  75 mg Oral Q8H    levothyroxine  50 mcg Oral Daily    linaCLOtide  145 mcg Oral Daily    mupirocin   Nasal BID    pantoprazole  40 mg Oral Daily    predniSONE  40 mg Oral Daily    sevelamer carbonate  800 mg Oral TID WM      Continuous Infusions:     PRN Meds:    Current Facility-Administered Medications:     acetaminophen, 1,000 mg, Oral, Q6H PRN    albuterol, 1 puff, Inhalation, Q6H PRN    albuterol-ipratropium, 3 mL, Nebulization, Q6H PRN    dextromethorphan-guaiFENesin  mg/5 ml, 5 mL, Oral, Q4H PRN    docusate sodium, 100 mg, Oral, BID PRN    hydrALAZINE, 10 mg, Intravenous, Q6H PRN    labetaloL, 10 mg, Intravenous, Q6H PRN    melatonin, 6 mg, Oral, Nightly PRN    ondansetron, 8 mg, Oral, Q8H PRN     Radiology:  I have personally reviewed the following imaging and agree with the radiologist.         Savita  MD Summer  Department of Hospital Medicine   [facility]   07/27/2025

## 2025-07-27 NOTE — PLAN OF CARE
Problem: Adult Inpatient Plan of Care  Goal: Plan of Care Review  Outcome: Progressing  Goal: Patient-Specific Goal (Individualized)  Outcome: Progressing  Goal: Absence of Hospital-Acquired Illness or Injury  Outcome: Progressing  Goal: Optimal Comfort and Wellbeing  Outcome: Progressing  Goal: Readiness for Transition of Care  Outcome: Progressing     Problem: Hemodialysis  Goal: Safe, Effective Therapy Delivery  7/27/2025 0130 by Denisa Gil RN  Outcome: Progressing  7/27/2025 0130 by Denisa Gil RN  Outcome: Progressing  Goal: Effective Tissue Perfusion  7/27/2025 0130 by Denisa Gil RN  Outcome: Progressing  7/27/2025 0130 by Denisa Gil RN  Outcome: Progressing  Goal: Absence of Infection Signs and Symptoms  7/27/2025 0130 by Denisa Gil RN  Outcome: Progressing  7/27/2025 0130 by Denisa Gil RN  Outcome: Progressing

## 2025-07-27 NOTE — CONSULTS
Inpatient consult to Cardiology  Consult performed by: Immanuel Flower AGACNP-BC  Consult ordered by: Savita Wheeler MD  Reason for consult: chest discomfort, NSTEMI          [facility]  Cardiology  Consult Note    Patient Name: Ted Edwards  MRN: [id number]  Admission Date: 2025  Hospital Length of Stay: 3 days  Code Status: Prior   Attending Provider: Savita Wheeler MD   Consulting Provider: CA Ledesma  Primary Care Physician: No, Primary Doctor  Principal Problem:<principal problem not specified>    Patient information was obtained from patient, past medical records, and ER records.     Subjective:     Chief Complaint:  Consulted for chest discomfort and NSTEMI     HPI:   This is a 44-year-old female, who is known to Dr. Willett, with a history of ESRD on HD, HTN, HLD, pulmonary hypertension, tobacco use.  She presented to the ER on 2025 with complaints of shortness of breath.  Patient typically dialyzes 4 times a week at home but had not been dialyzing secondary to feeling ill.  She said she had been dealing with bronchitis and reported a productive cough.  She was admitted to hospital medicine for further workup.  She was noted to be anemic.  Creatinine was markedly elevated.  Initial high sensitivity troponin was 40 and then trended down on repeat.  Patient stated she had some chest discomfort on 2025 overnight.  Initial EKG revealed normal sinus rhythm without acute ischemic changes.  [facility] has been consulted for chest discomfort and NSTEMI.        PMH: ESRD on HD, HTN, HLD, pulmonary hypertension, tobacco use  PSH:  Cholecystectomy, thoracentesis,  section, AV fistula placement, hysterectomy, partial left thyroidectomy, tonsillectomy  Social History:  Current tobacco use, denies EtOH and illicit drug use  Family History:  Father-MI; Mother-MI, pulmonary embolism    Previous Cardiac Diagnostics:   Echo 25  Left Ventricle: The left  ventricle is normal in size. Mildly increased wall thickness. There is mild concentric hypertrophy. There is normal systolic function with a visually estimated ejection fraction of 60 - 65%. Grade II diastolic dysfunction.  Right Ventricle: The right ventricle is normal in size measuring 3.3 cm. Systolic function is normal.  Left Atrium: The left atrium is moderately dilated. Agitated saline study of the atrial septum is negative after vasalva maneuver, suggesting absence of intracardiac shunt at the atrial level.  Aortic Valve: The aortic valve is a trileaflet valve. There is mild aortic valve sclerosis.  Mitral Valve: Mildly calcified posterior leaflet. There is mild stenosis. The mean pressure gradient across the mitral valve is 7 mmHg at a heart rate of  bpm.  Tricuspid Valve: Mildly thickened leaflets.  Pericardium: There is a small effusion.      Review of patient's allergies indicates:  No Known Allergies    No current facility-administered medications on file prior to encounter.     Current Outpatient Medications on File Prior to Encounter   Medication Sig    albuterol (PROVENTIL/VENTOLIN HFA) 90 mcg/actuation inhaler Inhale 1 puff into the lungs every 6 (six) hours as needed for Wheezing or Shortness of Breath.    amLODIPine (NORVASC) 10 MG tablet Take 10 mg by mouth once daily.    fluticasone propionate (FLONASE) 50 mcg/actuation nasal spray 2 sprays by Each Nostril route daily as needed.    acetaminophen (TYLENOL) 325 MG tablet Take 2 tablets (650 mg total) by mouth every 8 (eight) hours as needed for Pain.    busPIRone (BUSPAR) 10 MG tablet Take 10 mg by mouth 2 (two) times daily.    clonazePAM (KLONOPIN) 1 MG tablet Take 1 mg by mouth nightly as needed.    cloNIDine (CATAPRES) 0.3 MG tablet Take 0.3 mg by mouth 3 (three) times daily.    DOCOSAHEXAENOIC ACID ORAL Take 1 capsule by mouth once daily.    docusate sodium (COLACE) 100 MG capsule Take 100 mg by mouth 2 (two) times daily as needed for  Constipation.    ferric citrate (AURYXIA) 210 mg iron Tab Take 3 tablets by mouth 3 (three) times daily before meals.    hydrALAZINE (APRESOLINE) 25 MG tablet Take 25 mg by mouth 2 (two) times daily.    levothyroxine (SYNTHROID) 50 MCG tablet Take 50 mcg by mouth once daily.    linaCLOtide (LINZESS) 145 mcg Cap capsule Take 145 mcg by mouth once daily.    omeprazole (PRILOSEC) 40 MG capsule Take 40 mg by mouth every morning.    plecanatide (TRULANCE) 3 mg Tab Take 3 mg by mouth once daily.    rosuvastatin (CRESTOR) 10 MG tablet Take 10 mg by mouth once daily.       Review of Systems:  Review of Systems   Constitutional: Negative.    HENT: Negative.     Eyes: Negative.    Respiratory: Negative.     Cardiovascular:  Positive for chest pain.   Gastrointestinal: Negative.    Endocrine: Negative.    Genitourinary: Negative.    Musculoskeletal: Negative.    Skin: Negative.    Allergic/Immunologic: Negative.    Neurological: Negative.    Hematological: Negative.    Psychiatric/Behavioral: Negative.         Objective:     Vital Signs (Most Recent):  Temp: 98.1 °F (36.7 °C) (07/27/25 0022)  Pulse: 80 (07/27/25 0510)  Resp: 20 (07/26/25 2229)  BP: (!) 142/79 (07/27/25 0510)  SpO2: (!) 91 % (07/27/25 0510) Vital Signs (24h Range):  Temp:  [97.5 °F (36.4 °C)-98.5 °F (36.9 °C)] 98.1 °F (36.7 °C)  Pulse:  [77-90] 80  Resp:  [18-20] 20  SpO2:  [91 %-97 %] 91 %  BP: (141-159)/(78-93) 142/79     Weight: 95.7 kg (210 lb 15.7 oz)  Body mass index is 36.21 kg/m².    SpO2: (!) 91 %         Intake/Output Summary (Last 24 hours) at 7/27/2025 0657  Last data filed at 7/26/2025 1432  Gross per 24 hour   Intake 450 ml   Output --   Net 450 ml       Lines/Drains/Airways       Peripheral Intravenous Line  Duration                  Hemodialysis AV Fistula Right upper arm -- days    Peripheral IV Single Lumen 07/24/25 1432 18 G Anterior;Left;Proximal Forearm 2 days                      Significant Labs:   Chemistries:   Recent Labs   Lab  "07/24/25  1429 07/25/25  0239 07/26/25  0156 07/27/25  0503    143 140 138   K 4.1 4.2 3.4* 4.0    103 98 97*   CO2 20* 20* 26 25   BUN 90.1* 94.8* 51.5* 64.4*   CREATININE 28.50* 29.80* 18.36* 20.75*   CALCIUM 9.9 9.5 9.6 9.8   PROT 7.9 6.9 7.3 6.8   BILITOT 0.8 0.7 0.7 0.6   ALKPHOS 77 68 87 72   ALT 17 12 15 17   AST 15 13 15 15   MG  --  3.00*  --   --    PHOS  --  8.9*  --   --         CBC/Anemia Labs: Coags:    Recent Labs   Lab 07/25/25 0239 07/26/25  0156 07/27/25  0503   WBC 7.05 7.55 6.72   HGB 7.6* 8.3* 7.8*   HCT 23.1* 24.6* 23.3*    194 189   MCV 94.7* 92.5 93.2   RDW 14.9 15.3 15.5   IRON  --  51  --    FERRITIN  --  2,544.16*  --     No results for input(s): "PT", "INR", "APTT" in the last 168 hours.         Significant Imaging:   Imaging Results              X-Ray Chest PA And Lateral (Final result)  Result time 07/24/25 13:11:38      Final result by Deng Michael MD (07/24/25 13:11:38)                   Impression:      Cardiomegaly and mild pulmonary edema.      Electronically signed by: Deng Michael  Date:    07/24/2025  Time:    13:11               Narrative:    EXAMINATION:  XR CHEST PA AND LATERAL    CLINICAL HISTORY:  Shortness of breath    TECHNIQUE:  Two-view    COMPARISON:  December 20, 2016..    FINDINGS:  Cardiopericardial silhouette is enlarged there is mild pulmonary edema with subtle ground-glass opacities.  Right basilar pleuroparenchymal scarring versus small pleural effusion and lower lung zone atelectasis.  There is no pneumothorax.                                        EKG:          Telemetry:  SR    Physical Exam:  Physical Exam  Constitutional:       Appearance: Normal appearance.   HENT:      Head: Atraumatic.   Eyes:      Extraocular Movements: Extraocular movements intact.      Conjunctiva/sclera: Conjunctivae normal.   Cardiovascular:      Rate and Rhythm: Normal rate and regular rhythm.      Pulses: Normal pulses.      Heart sounds: Normal heart " sounds.   Pulmonary:      Effort: Pulmonary effort is normal.      Breath sounds: Normal breath sounds.   Abdominal:      Palpations: Abdomen is soft.   Musculoskeletal:         General: Normal range of motion.      Cervical back: Neck supple.   Skin:     General: Skin is warm and dry.   Neurological:      Mental Status: She is alert and oriented to person, place, and time.   Psychiatric:         Mood and Affect: Mood normal.         Behavior: Behavior normal.         Home Medications:   Medications Ordered Prior to Encounter[1]    Current Inpatient Medications:  Current Medications[2]           Assessment:     IMPRESSION:  Abnormal troponin, Not ACS  -HS Troponin 40, 37  Chest pain  ESRD on HD  -Noncompliant with home HD  Anemia  HTN  HLD  Pulmonary HTN  Tobacco use    PLAN:     PLAN:  No plans for invasive cardiac work-up at this time  Continue home medications  Consider PRBC transfusion to keep Hgb >9  Fluid management per Renal   Outpatient LexiPET stress test  F/U with Dr. Willett after completion of stress test  Will sign off. Thank you for allowing us to participate in the care of this patient. Please call us with any questions.      Thank you for your consult.     Immanuel Flower [facility]  Cardiology  [facility]  07/27/2025 6:57 AM         [1]   No current facility-administered medications on file prior to encounter.     Current Outpatient Medications on File Prior to Encounter   Medication Sig Dispense Refill    albuterol (PROVENTIL/VENTOLIN HFA) 90 mcg/actuation inhaler Inhale 1 puff into the lungs every 6 (six) hours as needed for Wheezing or Shortness of Breath.      amLODIPine (NORVASC) 10 MG tablet Take 10 mg by mouth once daily.      fluticasone propionate (FLONASE) 50 mcg/actuation nasal spray 2 sprays by Each Nostril route daily as needed.      acetaminophen (TYLENOL) 325 MG tablet Take 2 tablets (650 mg total) by mouth every 8 (eight) hours as needed for Pain.  0    busPIRone  (BUSPAR) 10 MG tablet Take 10 mg by mouth 2 (two) times daily.      clonazePAM (KLONOPIN) 1 MG tablet Take 1 mg by mouth nightly as needed.      cloNIDine (CATAPRES) 0.3 MG tablet Take 0.3 mg by mouth 3 (three) times daily.      DOCOSAHEXAENOIC ACID ORAL Take 1 capsule by mouth once daily.      docusate sodium (COLACE) 100 MG capsule Take 100 mg by mouth 2 (two) times daily as needed for Constipation.      ferric citrate (AURYXIA) 210 mg iron Tab Take 3 tablets by mouth 3 (three) times daily before meals.      hydrALAZINE (APRESOLINE) 25 MG tablet Take 25 mg by mouth 2 (two) times daily.      levothyroxine (SYNTHROID) 50 MCG tablet Take 50 mcg by mouth once daily.      linaCLOtide (LINZESS) 145 mcg Cap capsule Take 145 mcg by mouth once daily.      omeprazole (PRILOSEC) 40 MG capsule Take 40 mg by mouth every morning.      plecanatide (TRULANCE) 3 mg Tab Take 3 mg by mouth once daily.      rosuvastatin (CRESTOR) 10 MG tablet Take 10 mg by mouth once daily.     [2]   Current Facility-Administered Medications:     acetaminophen tablet 1,000 mg, 1,000 mg, Oral, Q6H PRN, Roger Draper FNP, 1,000 mg at 07/25/25 0451    albuterol inhaler 1 puff, 1 puff, Inhalation, Q6H PRN, Willy Moran MD    albuterol-ipratropium 2.5 mg-0.5 mg/3 mL nebulizer solution 3 mL, 3 mL, Nebulization, Q6H PRN, Savita Wheeler MD    amLODIPine tablet 10 mg, 10 mg, Oral, Daily, Willy Moran MD, 10 mg at 07/26/25 1058    atorvastatin tablet 40 mg, 40 mg, Oral, Daily, Willy Moran MD, 40 mg at 07/26/25 1059    busPIRone tablet 10 mg, 10 mg, Oral, TID, Willy Moran MD, 10 mg at 07/26/25 2230    cloNIDine tablet 0.3 mg, 0.3 mg, Oral, TID, Willy Moran MD, 0.3 mg at 07/26/25 8820    dextromethorphan-guaiFENesin  mg/5 ml liquid 5 mL, 5 mL, Oral, Q4H PRN, Savita Wheeler MD    docusate sodium capsule 100 mg, 100 mg, Oral, BID PRN, Willy Moran MD    epoetin zoraida-epbx injection 20,000 Units, 20,000 Units, Subcutaneous,  Every Mon, Wed, Fri, Delilah Stratton, FNP, 20,000 Units at 07/25/25 1226    hydrALAZINE injection 10 mg, 10 mg, Intravenous, Q6H PRN, Roger Draper FNP, 10 mg at 07/25/25 0009    hydrALAZINE tablet 75 mg, 75 mg, Oral, Q8H, Savita Wheeler MD, 75 mg at 07/27/25 0511    labetaloL injection 10 mg, 10 mg, Intravenous, Q6H PRN, Savita Wheeler MD    levothyroxine tablet 50 mcg, 50 mcg, Oral, Daily, Willy Moran MD, 50 mcg at 07/26/25 1058    linaCLOtide capsule 145 mcg, 145 mcg, Oral, Daily, Willy Moran MD, 145 mcg at 07/26/25 1059    melatonin tablet 6 mg, 6 mg, Oral, Nightly PRN, Roger Draper FNP, 6 mg at 07/26/25 2256    mupirocin 2 % ointment, , Nasal, BID, Savita Wheeler MD, Given at 07/26/25 2231    ondansetron disintegrating tablet 8 mg, 8 mg, Oral, Q8H PRN, Willy Moran MD    pantoprazole EC tablet 40 mg, 40 mg, Oral, Daily, Willy Moran MD, 40 mg at 07/26/25 1059    predniSONE tablet 40 mg, 40 mg, Oral, Daily, Savita Wheeler MD    sevelamer carbonate tablet 800 mg, 800 mg, Oral, TID WM, Willy Moran MD, 800 mg at 07/26/25 8777

## 2025-07-28 VITALS
SYSTOLIC BLOOD PRESSURE: 154 MMHG | HEART RATE: 96 BPM | WEIGHT: 213.5 LBS | TEMPERATURE: 99 F | RESPIRATION RATE: 18 BRPM | DIASTOLIC BLOOD PRESSURE: 87 MMHG | HEIGHT: 64 IN | OXYGEN SATURATION: 93 % | BODY MASS INDEX: 36.45 KG/M2

## 2025-07-28 PROBLEM — R06.02 SHORTNESS OF BREATH: Status: ACTIVE | Noted: 2025-07-28

## 2025-07-28 LAB
ALBUMIN SERPL-MCNC: 3.6 G/DL (ref 3.5–5)
ALBUMIN/GLOB SERPL: 1.1 RATIO (ref 1.1–2)
ALP SERPL-CCNC: 73 UNIT/L (ref 40–150)
ALT SERPL-CCNC: 15 UNIT/L (ref 0–55)
ANION GAP SERPL CALC-SCNC: 16 MEQ/L
AST SERPL-CCNC: 15 UNIT/L (ref 11–45)
BASOPHILS # BLD AUTO: 0.01 X10(3)/MCL
BASOPHILS NFR BLD AUTO: 0.1 %
BILIRUB SERPL-MCNC: 0.6 MG/DL
BUN SERPL-MCNC: 83.8 MG/DL (ref 7–18.7)
CALCIUM SERPL-MCNC: 10.2 MG/DL (ref 8.4–10.2)
CHLORIDE SERPL-SCNC: 99 MMOL/L (ref 98–107)
CO2 SERPL-SCNC: 22 MMOL/L (ref 22–29)
CREAT SERPL-MCNC: 22.14 MG/DL (ref 0.55–1.02)
CREAT/UREA NIT SERPL: 4
EOSINOPHIL # BLD AUTO: 0.01 X10(3)/MCL (ref 0–0.9)
EOSINOPHIL NFR BLD AUTO: 0.1 %
ERYTHROCYTE [DISTWIDTH] IN BLOOD BY AUTOMATED COUNT: 15.5 % (ref 11.5–17)
GFR SERPLBLD CREATININE-BSD FMLA CKD-EPI: 2 ML/MIN/1.73/M2
GLOBULIN SER-MCNC: 3.4 GM/DL (ref 2.4–3.5)
GLUCOSE SERPL-MCNC: 119 MG/DL (ref 74–100)
HCT VFR BLD AUTO: 23.9 % (ref 37–47)
HGB BLD-MCNC: 8.1 G/DL (ref 12–16)
IMM GRANULOCYTES # BLD AUTO: 0.08 X10(3)/MCL (ref 0–0.04)
IMM GRANULOCYTES NFR BLD AUTO: 1 %
LYMPHOCYTES # BLD AUTO: 0.84 X10(3)/MCL (ref 0.6–4.6)
LYMPHOCYTES NFR BLD AUTO: 10.3 %
MCH RBC QN AUTO: 31.2 PG (ref 27–31)
MCHC RBC AUTO-ENTMCNC: 33.9 G/DL (ref 33–36)
MCV RBC AUTO: 91.9 FL (ref 80–94)
MONOCYTES # BLD AUTO: 0.58 X10(3)/MCL (ref 0.1–1.3)
MONOCYTES NFR BLD AUTO: 7.1 %
NEUTROPHILS # BLD AUTO: 6.67 X10(3)/MCL (ref 2.1–9.2)
NEUTROPHILS NFR BLD AUTO: 81.4 %
NRBC BLD AUTO-RTO: 0.5 %
OHS QRS DURATION: 110 MS
OHS QTC CALCULATION: 484 MS
PLATELET # BLD AUTO: 200 X10(3)/MCL (ref 130–400)
PMV BLD AUTO: 10.1 FL (ref 7.4–10.4)
POTASSIUM SERPL-SCNC: 4.6 MMOL/L (ref 3.5–5.1)
PROT SERPL-MCNC: 7 GM/DL (ref 6.4–8.3)
RBC # BLD AUTO: 2.6 X10(6)/MCL (ref 4.2–5.4)
SODIUM SERPL-SCNC: 137 MMOL/L (ref 136–145)
WBC # BLD AUTO: 8.19 X10(3)/MCL (ref 4.5–11.5)

## 2025-07-28 PROCEDURE — 80100016 HC MAINTENANCE HEMODIALYSIS

## 2025-07-28 PROCEDURE — 93010 ELECTROCARDIOGRAM REPORT: CPT | Mod: ,,, | Performed by: INTERNAL MEDICINE

## 2025-07-28 PROCEDURE — 25000003 PHARM REV CODE 250: Performed by: INTERNAL MEDICINE

## 2025-07-28 PROCEDURE — 85025 COMPLETE CBC W/AUTO DIFF WBC: CPT | Performed by: NURSE PRACTITIONER

## 2025-07-28 PROCEDURE — 36415 COLL VENOUS BLD VENIPUNCTURE: CPT | Performed by: NURSE PRACTITIONER

## 2025-07-28 PROCEDURE — 80053 COMPREHEN METABOLIC PANEL: CPT | Performed by: NURSE PRACTITIONER

## 2025-07-28 PROCEDURE — 93005 ELECTROCARDIOGRAM TRACING: CPT

## 2025-07-28 PROCEDURE — 63600175 PHARM REV CODE 636 W HCPCS: Mod: JZ,TB | Performed by: INTERNAL MEDICINE

## 2025-07-28 PROCEDURE — 25000003 PHARM REV CODE 250

## 2025-07-28 RX ORDER — BUTALBITAL, ACETAMINOPHEN AND CAFFEINE 50; 325; 40 MG/1; MG/1; MG/1
1 TABLET ORAL ONCE
Status: COMPLETED | OUTPATIENT
Start: 2025-07-28 | End: 2025-07-28

## 2025-07-28 RX ORDER — FAMOTIDINE 10 MG/ML
20 INJECTION, SOLUTION INTRAVENOUS ONCE
Status: COMPLETED | OUTPATIENT
Start: 2025-07-28 | End: 2025-07-28

## 2025-07-28 RX ORDER — ALUMINUM HYDROXIDE, MAGNESIUM HYDROXIDE, AND SIMETHICONE 1200; 120; 1200 MG/30ML; MG/30ML; MG/30ML
30 SUSPENSION ORAL ONCE
Status: COMPLETED | OUTPATIENT
Start: 2025-07-28 | End: 2025-07-28

## 2025-07-28 RX ORDER — PANTOPRAZOLE SODIUM 40 MG/10ML
40 INJECTION, POWDER, LYOPHILIZED, FOR SOLUTION INTRAVENOUS ONCE
Status: DISCONTINUED | OUTPATIENT
Start: 2025-07-28 | End: 2025-07-28

## 2025-07-28 RX ORDER — BUTALBITAL, ACETAMINOPHEN AND CAFFEINE 50; 325; 40 MG/1; MG/1; MG/1
1 TABLET ORAL EVERY 6 HOURS PRN
Qty: 20 TABLET | Refills: 0 | Status: SHIPPED | OUTPATIENT
Start: 2025-07-28

## 2025-07-28 RX ORDER — HYDRALAZINE HYDROCHLORIDE 25 MG/1
75 TABLET, FILM COATED ORAL EVERY 8 HOURS
Qty: 270 TABLET | Refills: 1 | Status: SHIPPED | OUTPATIENT
Start: 2025-07-28

## 2025-07-28 RX ORDER — ACETAMINOPHEN 500 MG
500 TABLET ORAL EVERY 6 HOURS PRN
Qty: 30 TABLET | Refills: 0 | Status: SHIPPED | OUTPATIENT
Start: 2025-07-28

## 2025-07-28 RX ORDER — GUAIFENESIN AND DEXTROMETHORPHAN HYDROBROMIDE 10; 100 MG/5ML; MG/5ML
5 SYRUP ORAL EVERY 4 HOURS PRN
Qty: 473 ML | Refills: 0 | Status: SHIPPED | OUTPATIENT
Start: 2025-07-28

## 2025-07-28 RX ORDER — AMOXICILLIN AND CLAVULANATE POTASSIUM 500; 125 MG/1; MG/1
1 TABLET, FILM COATED ORAL DAILY
Qty: 7 TABLET | Refills: 0 | Status: SHIPPED | OUTPATIENT
Start: 2025-07-28 | End: 2025-08-04

## 2025-07-28 RX ORDER — AMOXICILLIN AND CLAVULANATE POTASSIUM 500; 125 MG/1; MG/1
1 TABLET, FILM COATED ORAL DAILY
Qty: 5 TABLET | Refills: 0 | Status: SHIPPED | OUTPATIENT
Start: 2025-07-28 | End: 2025-07-28

## 2025-07-28 RX ADMIN — MUPIROCIN: 20 OINTMENT TOPICAL at 11:07

## 2025-07-28 RX ADMIN — CLONIDINE HYDROCHLORIDE 0.3 MG: 0.2 TABLET ORAL at 03:07

## 2025-07-28 RX ADMIN — BUTALBITAL, ACETAMINOPHEN, AND CAFFEINE 1 TABLET: 325; 50; 40 TABLET ORAL at 04:07

## 2025-07-28 RX ADMIN — SEVELAMER CARBONATE 800 MG: 800 TABLET, FILM COATED ORAL at 06:07

## 2025-07-28 RX ADMIN — HYDRALAZINE HYDROCHLORIDE 75 MG: 50 TABLET ORAL at 05:07

## 2025-07-28 RX ADMIN — LINACLOTIDE 145 MCG: 145 CAPSULE, GELATIN COATED ORAL at 11:07

## 2025-07-28 RX ADMIN — PANTOPRAZOLE SODIUM 40 MG: 40 TABLET, DELAYED RELEASE ORAL at 11:07

## 2025-07-28 RX ADMIN — EPOETIN ALFA-EPBX 20000 UNITS: 10000 INJECTION, SOLUTION INTRAVENOUS; SUBCUTANEOUS at 09:07

## 2025-07-28 RX ADMIN — FAMOTIDINE 20 MG: 10 INJECTION, SOLUTION INTRAVENOUS at 11:07

## 2025-07-28 RX ADMIN — PREDNISONE 40 MG: 20 TABLET ORAL at 11:07

## 2025-07-28 RX ADMIN — ATORVASTATIN CALCIUM 40 MG: 40 TABLET, FILM COATED ORAL at 11:07

## 2025-07-28 RX ADMIN — ACETAMINOPHEN 1000 MG: 500 TABLET ORAL at 09:07

## 2025-07-28 RX ADMIN — HYDRALAZINE HYDROCHLORIDE 75 MG: 50 TABLET ORAL at 03:07

## 2025-07-28 RX ADMIN — AMLODIPINE BESYLATE 10 MG: 5 TABLET ORAL at 11:07

## 2025-07-28 RX ADMIN — ALUMINUM HYDROXIDE, MAGNESIUM HYDROXIDE, AND DIMETHICONE 30 ML: 200; 20; 200 SUSPENSION ORAL at 01:07

## 2025-07-28 RX ADMIN — SEVELAMER CARBONATE 800 MG: 800 TABLET, FILM COATED ORAL at 11:07

## 2025-07-28 RX ADMIN — LEVOTHYROXINE SODIUM 50 MCG: 0.05 TABLET ORAL at 11:07

## 2025-07-28 NOTE — PROGRESS NOTES
Nephrology Progress Note  [facility]      Patient Name: Ted Edwards  Age: 44 y.o.  : 1980  MRN: [id number]  Admission Date: 2025      HPI:    Ted Edwards is a 44 y.o. female with a history of ESRD on HD, hypertension, anemia secondary to chronic kidney disease, hyperlipidemia, hypothyroidism, and substance abuse (discontinued use in ).  She presented to the ED yesterday with shortness of breath.  Patient is on hemodialysis and usually has hemodialysis 4 times a week and is followed by Dr. Peraza, patient reportedly has missed the last 8 treatments secondary to feeling ill with symptoms of bronchitis including productive cough.  In addition patient reported orthopnea, dyspnea on exertion, and peripheral edema.  Patient is current everyday smoker.  Chest x-ray revealed pulmonary edema with cardiomegaly.     Patient is lying in bed awake.  She is on nasal cannula at 2 L and reports comfort with breathing.  She does not wear home oxygen.  Patient reports that she continues to have a cough, it is nonproductive.  She reports history of allergic rhinitis and appears to have a postnasal drip.  She has not been taking any medications to treat symptoms but states the cough has been ongoing for several weeks.  She has no other physical complaints currently.  Phosphorus is elevated, patient is on Auryxia at home, she states that she does take as prescribed with meals.  She reports appetite has been good.  She reports having regular bowel movements.  She is anuric.    Interval History:  2025  Sitting up in bed currently undergoing breathing treatment.  NAD.  Underwent HD yesterday states that breathing is much better today and feels like she has sign.      2025  Currently tolerating dialysis.  We plan to remove 3 L of fluid on her today.  She lives in [place] and is home hemodialysis patient.  She is supposed to do 4 times a week hemodialysis but she has sparingly  "does 4 times a week.    ROS:    Review of Systems   Constitutional: Negative.    HENT: Negative.     Eyes: Negative.    Respiratory: Negative.     Cardiovascular: Negative.    Gastrointestinal: Negative.    Genitourinary: Negative.    Musculoskeletal: Negative.    Skin: Negative.    Neurological: Negative.    Endo/Heme/Allergies: Negative.    Psychiatric/Behavioral: Negative.         Vital Signs:  BP (!) 148/86   Pulse 84   Temp 97.8 °F (36.6 °C) (Oral)   Resp 18   Ht 5' 4" (1.626 m)   Wt 96.8 kg (213 lb 8 oz) Comment: standing weight  LMP 09/20/2018   SpO2 (!) 93%   Breastfeeding No   BMI 36.65 kg/m²   Body mass index is 36.65 kg/m².    GEN:  Well developed and nourished.  Awake alert oriented time person place.  No acute respiratory distress noted.  HEENT: Conjunctiva anicteric, pupils equal, MMM, OP benign  CV: RRR +S1,S2 without murmur  PULM: CTAB, unlabored  ABD: Soft, NT/ND abdomen with NABS  EXT: No cyanosis or edema  SKIN: Warm and dry  PSYCH: Awake, alert, and appropriately conversant  Vascular access:  Dialysis Access:  Right arm AV fistula    Labs:  Recent Results (from the past 24 hours)   Respiratory Panel    Collection Time: 07/27/25  3:47 PM   Result Value Ref Range    Adenovirus Not Detected Not Detected    Coronavirus 229E Not Detected Not Detected    Coronavirus HKU1 Not Detected Not Detected    Coronavirus NL63 Not Detected Not Detected    Coronavirus OC43 PCR, Common Cold Virus Not Detected Not Detected    Human Metapneumovirus Not Detected Not Detected    Parainfluenza Virus 1 Not Detected Not Detected    Parainfluenza Virus 2 Not Detected Not Detected    Parainfluenza Virus 3 Not Detected Not Detected    Parainfluenza Virus 4 Not Detected Not Detected    Bordetella pertussis (ptxP) Not Detected Not Detected    Chlamydia pneumoniae Not Detected Not Detected    Mycoplasma pneumoniae Not Detected Not Detected    Human Rhinovirus/Enterovirus Not Detected Not Detected    Bordetella " parapertussis ([id number]) Not Detected Not Detected   COVID/RSV/FLU A&B PCR    Collection Time: 07/27/25  3:47 PM   Result Value Ref Range    Influenza A PCR Not Detected Not Detected    Influenza B PCR Not Detected Not Detected    Respiratory Syncytial Virus PCR Not Detected Not Detected    SARS-CoV-2 PCR Not Detected Not Detected, Negative   Comprehensive Metabolic Panel    Collection Time: 07/28/25  6:06 AM   Result Value Ref Range    Sodium 137 136 - 145 mmol/L    Potassium 4.6 3.5 - 5.1 mmol/L    Chloride 99 98 - 107 mmol/L    CO2 22 22 - 29 mmol/L    Glucose 119 (H) 74 - 100 mg/dL    Blood Urea Nitrogen 83.8 (H) 7.0 - 18.7 mg/dL    Creatinine 22.14 (H) 0.55 - 1.02 mg/dL    Calcium 10.2 8.4 - 10.2 mg/dL    Protein Total 7.0 6.4 - 8.3 gm/dL    Albumin 3.6 3.5 - 5.0 g/dL    Globulin 3.4 2.4 - 3.5 gm/dL    Albumin/Globulin Ratio 1.1 1.1 - 2.0 ratio    Bilirubin Total 0.6 <=1.5 mg/dL    ALP 73 40 - 150 unit/L    ALT 15 0 - 55 unit/L    AST 15 11 - 45 unit/L    eGFR 2 mL/min/1.73/m2    Anion Gap 16.0 mEq/L    BUN/Creatinine Ratio 4    CBC with Differential    Collection Time: 07/28/25  6:06 AM   Result Value Ref Range    WBC 8.19 4.50 - 11.50 x10(3)/mcL    RBC 2.60 (L) 4.20 - 5.40 x10(6)/mcL    Hgb 8.1 (L) 12.0 - 16.0 g/dL    Hct 23.9 (L) 37.0 - 47.0 %    MCV 91.9 80.0 - 94.0 fL    MCH 31.2 (H) 27.0 - 31.0 pg    MCHC 33.9 33.0 - 36.0 g/dL    RDW 15.5 11.5 - 17.0 %    Platelet 200 130 - 400 x10(3)/mcL    MPV 10.1 7.4 - 10.4 fL    Neut % 81.4 %    Lymph % 10.3 %    Mono % 7.1 %    Eos % 0.1 %    Basophil % 0.1 %    Imm Grans % 1.0 %    Neut # 6.67 2.1 - 9.2 x10(3)/mcL    Lymph # 0.84 0.6 - 4.6 x10(3)/mcL    Mono # 0.58 0.1 - 1.3 x10(3)/mcL    Eos # 0.01 0 - 0.9 x10(3)/mcL    Baso # 0.01 <=0.2 x10(3)/mcL    Imm Gran # 0.08 (H) 0.00 - 0.04 x10(3)/mcL    NRBC% 0.5 %     Chest x-ray done on 07/27/2025 is improved.    Assessment:    ESRD on HD with fluid volume overload- continue MWF HD schedule  Pulmonary  edema  Hypertension  Anemia secondary to CKD slight improvement today, continued on  Tobacco use  Hypokalemia-continue to monitor       Recommendations:   Stressed on the patient to do dialysis 4 times a week  Discharged only if stable at the end of dialysis today

## 2025-07-28 NOTE — NURSING
07/28/25 1313   Post-Hemodialysis Assessment   Blood Volume Processed (Liters) 54.2 L   Dialyzer Clearance Lightly streaked   Duration of Treatment 155 minutes   Total UF (mL) 2347 mL   Post-Hemodialysis Comments Tx completed. Pt requested to end tx after 2hr 35mins. Net 2.3L fluid removed. educated on importance of full length tx, but still wanted to end tx, c/o chest pain. MD notified and reinfused. Dr. Wheeler at bedside, and ordered stat EKG which was interpreted as NSR. also ordered pepcid. VSS throughout. No bleeding at access site on departure.